# Patient Record
Sex: FEMALE | Race: WHITE | NOT HISPANIC OR LATINO | Employment: UNEMPLOYED | URBAN - METROPOLITAN AREA
[De-identification: names, ages, dates, MRNs, and addresses within clinical notes are randomized per-mention and may not be internally consistent; named-entity substitution may affect disease eponyms.]

---

## 2017-03-08 ENCOUNTER — TELEPHONE (OUTPATIENT)
Dept: OBSTETRICS AND GYNECOLOGY | Facility: CLINIC | Age: 52
End: 2017-03-08

## 2017-03-08 RX ORDER — FLUCONAZOLE 150 MG/1
150 TABLET ORAL ONCE
Qty: 1 TABLET | Refills: 0 | Status: SHIPPED | OUTPATIENT
Start: 2017-03-08 | End: 2017-03-08

## 2017-03-08 NOTE — TELEPHONE ENCOUNTER
----- Message from Celeste Byers sent at 3/8/2017  2:33 PM EST -----  Contact: Patient  Patient c/o yeast infection - itching.  Requesting Diflucan.  Going out of town Friday - requesting rx to be called in by then.

## 2017-04-17 ENCOUNTER — OFFICE VISIT (OUTPATIENT)
Dept: OBSTETRICS AND GYNECOLOGY | Facility: CLINIC | Age: 52
End: 2017-04-17

## 2017-04-17 VITALS
HEART RATE: 66 BPM | BODY MASS INDEX: 25.71 KG/M2 | SYSTOLIC BLOOD PRESSURE: 126 MMHG | HEIGHT: 64 IN | WEIGHT: 150.6 LBS | DIASTOLIC BLOOD PRESSURE: 67 MMHG

## 2017-04-17 DIAGNOSIS — N89.8 VAGINAL DISCHARGE: ICD-10-CM

## 2017-04-17 DIAGNOSIS — R53.83 FATIGUE, UNSPECIFIED TYPE: ICD-10-CM

## 2017-04-17 DIAGNOSIS — Z01.419 WELL WOMAN EXAM WITH ROUTINE GYNECOLOGICAL EXAM: ICD-10-CM

## 2017-04-17 DIAGNOSIS — Z12.4 ROUTINE CERVICAL SMEAR: Primary | ICD-10-CM

## 2017-04-17 LAB
BASOPHILS # BLD AUTO: 0.01 10*3/MM3 (ref 0–0.2)
BASOPHILS NFR BLD AUTO: 0.2 % (ref 0–1.5)
EOSINOPHIL # BLD AUTO: 0.17 10*3/MM3 (ref 0–0.7)
EOSINOPHIL NFR BLD AUTO: 3.2 % (ref 0.3–6.2)
ERYTHROCYTE [DISTWIDTH] IN BLOOD BY AUTOMATED COUNT: 12.7 % (ref 11.7–13)
HCT VFR BLD AUTO: 40.4 % (ref 35.6–45.5)
HGB BLD-MCNC: 13.9 G/DL (ref 11.9–15.5)
IMM GRANULOCYTES # BLD: 0 10*3/MM3 (ref 0–0.03)
IMM GRANULOCYTES NFR BLD: 0 % (ref 0–0.5)
LYMPHOCYTES # BLD AUTO: 1.55 10*3/MM3 (ref 0.9–4.8)
LYMPHOCYTES NFR BLD AUTO: 29.2 % (ref 19.6–45.3)
MCH RBC QN AUTO: 31.4 PG (ref 26.9–32)
MCHC RBC AUTO-ENTMCNC: 34.4 G/DL (ref 32.4–36.3)
MCV RBC AUTO: 91.2 FL (ref 80.5–98.2)
MONOCYTES # BLD AUTO: 0.45 10*3/MM3 (ref 0.2–1.2)
MONOCYTES NFR BLD AUTO: 8.5 % (ref 5–12)
NEUTROPHILS # BLD AUTO: 3.13 10*3/MM3 (ref 1.9–8.1)
NEUTROPHILS NFR BLD AUTO: 58.9 % (ref 42.7–76)
PLATELET # BLD AUTO: 170 10*3/MM3 (ref 140–500)
RBC # BLD AUTO: 4.43 10*6/MM3 (ref 3.9–5.2)
TSH SERPL DL<=0.005 MIU/L-ACNC: 0.94 MIU/ML (ref 0.27–4.2)
WBC # BLD AUTO: 5.31 10*3/MM3 (ref 4.5–10.7)

## 2017-04-17 PROCEDURE — 99396 PREV VISIT EST AGE 40-64: CPT | Performed by: OBSTETRICS & GYNECOLOGY

## 2017-04-17 RX ORDER — CELECOXIB 200 MG/1
CAPSULE ORAL
COMMUNITY
Start: 2017-03-30 | End: 2017-11-07

## 2017-04-17 NOTE — PROGRESS NOTES
Subha Wright is a 51 y.o. female     CC: Annual and pap    Last annual 2015  Last pap   Last mammogram August 3, 2015  Last colonoscopy Approximately 2 years ago, some polyps removed.       History of Present Illness: due for pap; mammogram scheduled for Wednesday.  Is having considerable fatigue (even though periods are shorter and getting a little further apart).  Having d/c like another yeast infection; but has begun probiotics.     The following portions of the patient's history were reviewed and updated as appropriate: allergies, current medications, past family history, past medical history, past social history, past surgical history and problem list.    Review of Systems   Constitutional: Negative for fatigue and fever.   HENT: Negative for congestion.    Eyes: Negative for visual disturbance.   Respiratory: Negative for chest tightness and shortness of breath.    Cardiovascular: Negative for chest pain, palpitations and leg swelling.   Gastrointestinal: Negative for abdominal pain and blood in stool.   Endocrine: Negative for cold intolerance.   Genitourinary: Negative for difficulty urinating, dysuria, frequency, genital sores, hematuria, menstrual problem, pelvic pain, urgency, vaginal bleeding, vaginal discharge and vaginal pain.   Musculoskeletal: Negative for back pain.   Skin: Negative for color change and rash.   Neurological: Negative for syncope and headaches.   Psychiatric/Behavioral: Negative for sleep disturbance.       History reviewed. No pertinent past medical history.  Past Surgical History:   Procedure Laterality Date   • HEMORRHOIDECTOMY       OB History      Para Term  AB TAB SAB Ectopic Multiple Living    3 3        3        Menstrual History:  OB History      Para Term  AB TAB SAB Ectopic Multiple Living    3 3        3         Menarche age: 10  Patient's last menstrual period was 2017 (approximate).       Family  History   Problem Relation Age of Onset   • Migraines Mother    • Stroke Mother    • Diabetes Father    • Hypertension Father    • Colon cancer Brother      History   Smoking Status   • Former Smoker   Smokeless Tobacco   • Former User   • Quit date: 1990     History   Alcohol Use   • Yes     Comment: OCCASIONALLY       Objective   Physical Exam   Constitutional: She is oriented to person, place, and time. She appears well-developed and well-nourished.   HENT:   Head: Normocephalic and atraumatic.   Right Ear: External ear normal.   Left Ear: External ear normal.   Nose: Nose normal.   Eyes: EOM are normal. Pupils are equal, round, and reactive to light.   Neck: Normal range of motion. Neck supple. No thyromegaly present.   Cardiovascular: Normal rate, regular rhythm and normal heart sounds.    No murmur heard.  Pulmonary/Chest: Effort normal and breath sounds normal. She has no wheezes. She has no rales. She exhibits no tenderness. Right breast exhibits no inverted nipple, no mass, no nipple discharge, no skin change and no tenderness. Left breast exhibits no inverted nipple, no mass, no nipple discharge, no skin change and no tenderness. There is no breast swelling.   Abdominal: Soft. Bowel sounds are normal. She exhibits no distension and no mass. There is no tenderness. Hernia confirmed negative in the right inguinal area and confirmed negative in the left inguinal area.   Genitourinary: Uterus normal. No breast tenderness. Pelvic exam was performed with patient supine. There is no rash, tenderness or lesion on the right labia. There is no rash, tenderness or lesion on the left labia. Cervix exhibits no motion tenderness, no discharge and no friability. Right adnexum displays no mass and no tenderness. Left adnexum displays no mass and no tenderness. No erythema, tenderness or bleeding in the vagina. No vaginal discharge found.   Musculoskeletal: Normal range of motion. She exhibits no edema.   Neurological: She  is alert and oriented to person, place, and time.   Skin: Skin is warm and dry. No rash noted.   Psychiatric: She has a normal mood and affect. Judgment normal.   Nursing note and vitals reviewed.        Assessment/Plan   Catie was seen today for annual exam.    Diagnoses and all orders for this visit:    Routine cervical smear  -     IGP, Aptima HPV, Rfx 16 / 18,45    Well woman exam with routine gynecological exam    Fatigue, unspecified type: she will call and schedule with her PCP: Dr. Yolanda Dupree.  -     CBC & Differential  -     TSH    Vaginal discharge  -     NuSwab BV & Candida      Will call with lab results.  Will continue with annual WWE's, annual mammography, SBE's, and daily calcium + D

## 2017-04-18 ENCOUNTER — TELEPHONE (OUTPATIENT)
Dept: OBSTETRICS AND GYNECOLOGY | Facility: CLINIC | Age: 52
End: 2017-04-18

## 2017-04-19 ENCOUNTER — APPOINTMENT (OUTPATIENT)
Dept: WOMENS IMAGING | Facility: HOSPITAL | Age: 52
End: 2017-04-19

## 2017-04-19 PROCEDURE — 77067 SCR MAMMO BI INCL CAD: CPT | Performed by: RADIOLOGY

## 2017-04-19 PROCEDURE — 77063 BREAST TOMOSYNTHESIS BI: CPT | Performed by: RADIOLOGY

## 2017-04-19 PROCEDURE — G0202 SCR MAMMO BI INCL CAD: HCPCS | Performed by: RADIOLOGY

## 2017-04-20 ENCOUNTER — TELEPHONE (OUTPATIENT)
Dept: OBSTETRICS AND GYNECOLOGY | Facility: CLINIC | Age: 52
End: 2017-04-20

## 2017-04-20 LAB
A VAGINAE DNA VAG QL NAA+PROBE: ABNORMAL SCORE
BVAB2 DNA VAG QL NAA+PROBE: ABNORMAL SCORE
C ALBICANS DNA VAG QL NAA+PROBE: POSITIVE
C GLABRATA DNA VAG QL NAA+PROBE: NEGATIVE
CYTOLOGIST CVX/VAG CYTO: ABNORMAL
CYTOLOGY CVX/VAG DOC THIN PREP: ABNORMAL
DX ICD CODE: ABNORMAL
DX ICD CODE: ABNORMAL
HIV 1 & 2 AB SER-IMP: ABNORMAL
HPV I/H RISK 4 DNA CVX QL PROBE+SIG AMP: NEGATIVE
MEGA1 DNA VAG QL NAA+PROBE: ABNORMAL SCORE
OTHER STN SPEC: ABNORMAL
PATH REPORT.FINAL DX SPEC: ABNORMAL
PATHOLOGIST CVX/VAG CYTO: ABNORMAL
STAT OF ADQ CVX/VAG CYTO-IMP: ABNORMAL

## 2017-04-20 NOTE — TELEPHONE ENCOUNTER
Pt is aware of results.     Still waiting on vaginal culture results to come back. Pt states she is having a lot of vaginal irritation, a little whitish discharge but no odor and was wondering if you could send something in for relief?

## 2017-04-21 ENCOUNTER — TELEPHONE (OUTPATIENT)
Dept: OBSTETRICS AND GYNECOLOGY | Facility: CLINIC | Age: 52
End: 2017-04-21

## 2017-04-21 RX ORDER — FLUCONAZOLE 150 MG/1
150 TABLET ORAL ONCE
Qty: 1 TABLET | Refills: 0 | Status: SHIPPED | OUTPATIENT
Start: 2017-04-21 | End: 2017-04-21

## 2017-11-07 ENCOUNTER — OFFICE VISIT (OUTPATIENT)
Dept: OBSTETRICS AND GYNECOLOGY | Facility: CLINIC | Age: 52
End: 2017-11-07

## 2017-11-07 VITALS
DIASTOLIC BLOOD PRESSURE: 77 MMHG | HEART RATE: 70 BPM | WEIGHT: 149 LBS | SYSTOLIC BLOOD PRESSURE: 116 MMHG | BODY MASS INDEX: 25.44 KG/M2 | HEIGHT: 64 IN

## 2017-11-07 DIAGNOSIS — R10.32 LEFT LOWER QUADRANT PAIN: Primary | ICD-10-CM

## 2017-11-07 PROCEDURE — 99213 OFFICE O/P EST LOW 20 MIN: CPT | Performed by: OBSTETRICS & GYNECOLOGY

## 2017-11-07 NOTE — PROGRESS NOTES
Subjective   Catie Wright is a 51 y.o. female.   Cc: Left lower quadrant pain  History of Present Illness  At onset of left lower quadrant pain over the weekend at which is gradually improved through today.  Last menstrual period was approximately 1 month ago.  Her periods become less frequent and lighter.  She denies any deep dyspareunia.  Patient previously had a Pap smear with ASCUS HPV negative.  Patient did have an episode of diarrhea starting on Wednesday (pain started worsening Thursday) . no change in pain status with bowel movements  The following portions of the patient's history were reviewed and updated as appropriate: allergies, current medications, past family history, past medical history, past social history, past surgical history and problem list.    Review of Systems   Gastrointestinal: Positive for diarrhea.        Left lower quadrant pain   All other systems reviewed and are negative.      Objective   Physical Exam   Constitutional: She is oriented to person, place, and time. She appears well-developed and well-nourished. No distress.   Abdominal: Soft. Bowel sounds are normal. She exhibits no mass. There is tenderness in the left lower quadrant. There is no rebound and no guarding. No hernia.       Genitourinary: Vagina normal and uterus normal. Rectal exam shows external hemorrhoid. Rectal exam shows no mass. Pelvic exam was performed with patient supine. There is no lesion on the right labia. There is no lesion on the left labia. Cervix exhibits discharge. Cervix exhibits no motion tenderness and no friability. Right adnexum displays tenderness. Right adnexum displays no mass and no fullness. Left adnexum displays tenderness and fullness.   Neurological: She is alert and oriented to person, place, and time.   Skin: Skin is warm and dry.   Psychiatric: She has a normal mood and affect. Her behavior is normal. Judgment and thought content normal.   Vitals reviewed.      Assessment/Plan   Catie  was seen today for pelvic pain.    Diagnoses and all orders for this visit:    Left lower quadrant pain  Comments:  We'll get ultrasound with same-day appointment

## 2017-11-15 ENCOUNTER — PROCEDURE VISIT (OUTPATIENT)
Dept: OBSTETRICS AND GYNECOLOGY | Facility: CLINIC | Age: 52
End: 2017-11-15

## 2017-11-15 ENCOUNTER — OFFICE VISIT (OUTPATIENT)
Dept: OBSTETRICS AND GYNECOLOGY | Facility: CLINIC | Age: 52
End: 2017-11-15

## 2017-11-15 VITALS
SYSTOLIC BLOOD PRESSURE: 110 MMHG | BODY MASS INDEX: 25.61 KG/M2 | WEIGHT: 150 LBS | DIASTOLIC BLOOD PRESSURE: 62 MMHG | HEIGHT: 64 IN

## 2017-11-15 DIAGNOSIS — R10.2 PELVIC PAIN: Primary | ICD-10-CM

## 2017-11-15 DIAGNOSIS — R10.32 LLQ PAIN: Primary | ICD-10-CM

## 2017-11-15 PROCEDURE — 76830 TRANSVAGINAL US NON-OB: CPT | Performed by: OBSTETRICS & GYNECOLOGY

## 2017-11-15 PROCEDURE — 99213 OFFICE O/P EST LOW 20 MIN: CPT | Performed by: OBSTETRICS & GYNECOLOGY

## 2017-11-15 NOTE — PROGRESS NOTES
Subjective   Catie Wright is a 51 y.o. female.   Cc: Follow-up ultrasound for pelvic pain  History of Present Illness  Penicillins resolve her the most part now seems to be mostly muscular around her hip and groin.  Ultrasound was totally within normal limits.  Patient is reassured  The following portions of the patient's history were reviewed and updated as appropriate: allergies, current medications, past family history, past medical history, past social history, past surgical history and problem list.    Review of Systems   Genitourinary: Positive for pelvic pain.   All other systems reviewed and are negative.      Objective   Physical Exam   Constitutional: She is oriented to person, place, and time. She appears well-developed and well-nourished.   Neurological: She is alert and oriented to person, place, and time.   Skin: Skin is warm and dry.   Psychiatric: She has a normal mood and affect. Her behavior is normal. Judgment and thought content normal.   Vitals reviewed.      Assessment/Plan   Catie was seen today for gynecologic exam.    Diagnoses and all orders for this visit:    Pelvic pain  Comments:  Probably musculoskeletal in origin       Face-to-face consultation 15 minutes of which 15 minutes was spent discussing the findings

## 2017-11-28 ENCOUNTER — TELEPHONE (OUTPATIENT)
Dept: OBSTETRICS AND GYNECOLOGY | Facility: CLINIC | Age: 52
End: 2017-11-28

## 2017-11-28 RX ORDER — FLUCONAZOLE 150 MG/1
150 TABLET ORAL DAILY
Qty: 1 TABLET | Refills: 0 | Status: SHIPPED | OUTPATIENT
Start: 2017-11-28 | End: 2020-07-07

## 2017-11-28 NOTE — TELEPHONE ENCOUNTER
----- Message from Trey Lewis MD sent at 11/28/2017 11:02 AM EST -----  The patient prescription is been sent to her pharmacy.  If there is no relief she needs to come in to be examined  ----- Message -----     From: Bina Rahman     Sent: 11/28/2017  10:15 AM       To: Trey Lewis MD    Patient called stating that she believes she has a yeast infection and was wondering if she needed to come in or if she could have something called into her pharmacy for it? She states that Dr. Temple has in the past called her in a series of three-pills for a yeast infection and if you were able, could you send that in for her? Please advise.    Call back #: [phone]

## 2018-10-30 ENCOUNTER — APPOINTMENT (OUTPATIENT)
Dept: WOMENS IMAGING | Facility: HOSPITAL | Age: 53
End: 2018-10-30

## 2018-10-30 PROCEDURE — 77067 SCR MAMMO BI INCL CAD: CPT | Performed by: RADIOLOGY

## 2018-10-30 PROCEDURE — 77063 BREAST TOMOSYNTHESIS BI: CPT | Performed by: RADIOLOGY

## 2019-11-04 ENCOUNTER — APPOINTMENT (OUTPATIENT)
Dept: WOMENS IMAGING | Facility: HOSPITAL | Age: 54
End: 2019-11-04

## 2019-11-04 PROCEDURE — 77063 BREAST TOMOSYNTHESIS BI: CPT | Performed by: RADIOLOGY

## 2019-11-04 PROCEDURE — 77067 SCR MAMMO BI INCL CAD: CPT | Performed by: RADIOLOGY

## 2020-06-29 DIAGNOSIS — Z00.00 ANNUAL PHYSICAL EXAM: Primary | ICD-10-CM

## 2020-06-29 LAB
ALBUMIN SERPL-MCNC: 4.8 G/DL (ref 3.5–5.2)
ALBUMIN/GLOB SERPL: 2.2 G/DL
ALP SERPL-CCNC: 67 U/L (ref 39–117)
ALT SERPL-CCNC: 31 U/L (ref 1–33)
AST SERPL-CCNC: 20 U/L (ref 1–32)
BASOPHILS # BLD AUTO: 0.03 10*3/MM3 (ref 0–0.2)
BASOPHILS NFR BLD AUTO: 0.7 % (ref 0–1.5)
BILIRUB SERPL-MCNC: 0.6 MG/DL (ref 0.2–1.2)
BUN SERPL-MCNC: 7 MG/DL (ref 6–20)
BUN/CREAT SERPL: 8.6 (ref 7–25)
CALCIUM SERPL-MCNC: 9.6 MG/DL (ref 8.6–10.5)
CHLORIDE SERPL-SCNC: 101 MMOL/L (ref 98–107)
CHOLEST SERPL-MCNC: 242 MG/DL (ref 0–200)
CO2 SERPL-SCNC: 30.2 MMOL/L (ref 22–29)
CREAT SERPL-MCNC: 0.81 MG/DL (ref 0.57–1)
EOSINOPHIL # BLD AUTO: 0.13 10*3/MM3 (ref 0–0.4)
EOSINOPHIL NFR BLD AUTO: 2.9 % (ref 0.3–6.2)
ERYTHROCYTE [DISTWIDTH] IN BLOOD BY AUTOMATED COUNT: 12.8 % (ref 12.3–15.4)
GLOBULIN SER CALC-MCNC: 2.2 GM/DL
GLUCOSE SERPL-MCNC: 85 MG/DL (ref 65–99)
HCT VFR BLD AUTO: 39.4 % (ref 34–46.6)
HDLC SERPL-MCNC: 50 MG/DL (ref 40–60)
HGB BLD-MCNC: 13.9 G/DL (ref 12–15.9)
IMM GRANULOCYTES # BLD AUTO: 0.02 10*3/MM3 (ref 0–0.05)
IMM GRANULOCYTES NFR BLD AUTO: 0.5 % (ref 0–0.5)
LDLC SERPL CALC-MCNC: 159 MG/DL (ref 0–100)
LYMPHOCYTES # BLD AUTO: 1.6 10*3/MM3 (ref 0.7–3.1)
LYMPHOCYTES NFR BLD AUTO: 36.1 % (ref 19.6–45.3)
MCH RBC QN AUTO: 31.6 PG (ref 26.6–33)
MCHC RBC AUTO-ENTMCNC: 35.3 G/DL (ref 31.5–35.7)
MCV RBC AUTO: 89.5 FL (ref 79–97)
MONOCYTES # BLD AUTO: 0.34 10*3/MM3 (ref 0.1–0.9)
MONOCYTES NFR BLD AUTO: 7.7 % (ref 5–12)
NEUTROPHILS # BLD AUTO: 2.31 10*3/MM3 (ref 1.7–7)
NEUTROPHILS NFR BLD AUTO: 52.1 % (ref 42.7–76)
NRBC BLD AUTO-RTO: 0 /100 WBC (ref 0–0.2)
PLATELET # BLD AUTO: 186 10*3/MM3 (ref 140–450)
POTASSIUM SERPL-SCNC: 4.4 MMOL/L (ref 3.5–5.2)
PROT SERPL-MCNC: 7 G/DL (ref 6–8.5)
RBC # BLD AUTO: 4.4 10*6/MM3 (ref 3.77–5.28)
SODIUM SERPL-SCNC: 140 MMOL/L (ref 136–145)
TRIGL SERPL-MCNC: 163 MG/DL (ref 0–150)
VLDLC SERPL CALC-MCNC: 32.6 MG/DL
WBC # BLD AUTO: 4.43 10*3/MM3 (ref 3.4–10.8)

## 2020-07-07 ENCOUNTER — OFFICE VISIT (OUTPATIENT)
Dept: INTERNAL MEDICINE | Facility: CLINIC | Age: 55
End: 2020-07-07

## 2020-07-07 VITALS
HEIGHT: 64 IN | DIASTOLIC BLOOD PRESSURE: 72 MMHG | HEART RATE: 76 BPM | TEMPERATURE: 97.5 F | BODY MASS INDEX: 25.27 KG/M2 | WEIGHT: 148 LBS | SYSTOLIC BLOOD PRESSURE: 118 MMHG

## 2020-07-07 DIAGNOSIS — Z11.59 ENCOUNTER FOR HEPATITIS C SCREENING TEST FOR LOW RISK PATIENT: Primary | ICD-10-CM

## 2020-07-07 DIAGNOSIS — Z00.00 ANNUAL PHYSICAL EXAM: ICD-10-CM

## 2020-07-07 PROCEDURE — 99396 PREV VISIT EST AGE 40-64: CPT | Performed by: INTERNAL MEDICINE

## 2020-07-07 RX ORDER — MULTIVIT-MIN/IRON FUM/FOLIC AC 7.5 MG-4
1 TABLET ORAL DAILY
Qty: 30 TABLET | Refills: 11 | COMMUNITY
Start: 2020-07-07 | End: 2021-07-07

## 2020-07-07 RX ORDER — ZINC OXIDE 13 %
1 CREAM (GRAM) TOPICAL DAILY
Qty: 90 CAPSULE | Refills: 0 | COMMUNITY
Start: 2020-07-07 | End: 2021-12-14

## 2020-07-07 NOTE — PROGRESS NOTES
"Subha Wright is a 54 y.o. female and is here for a comprehensive physical exam. The patient reports no problems.  Daughter moved back in, is getting her eating better and exercising more.  She has added some vitamins/supplements.    Pt is UTD with annual gyn exam and mammo - [facility]          Social History     Socioeconomic History   • Marital status:      Spouse name: Not on file   • Number of children: Not on file   • Years of education: Not on file   • Highest education level: Not on file   Tobacco Use   • Smoking status: Never Smoker   • Smokeless tobacco: Former User   Substance and Sexual Activity   • Alcohol use: Yes     Comment: OCCASIONALLY   • Drug use: No   • Sexual activity: Yes     Partners: Male     Birth control/protection: Surgical     Comment: vassectomy       Family History   Problem Relation Age of Onset   • Migraines Mother    • Stroke Mother    • Diabetes Father    • Hypertension Father    • Colon cancer Brother 59        lung, colon   • Mental illness Sister         History reviewed. No pertinent past medical history.       Current Outpatient Medications:   •  Multiple Vitamins-Minerals (MULTIVITAMIN WITH MINERALS) tablet, Take 1 tablet by mouth Daily., Disp: 30 tablet, Rfl: 11  •  Probiotic Product (PROBIOTIC DAILY) capsule, Take 1 tablet by mouth Daily., Disp: 90 capsule, Rfl: 0    Review of Systems    Review of Systems   Constitutional: Negative.    HENT: Negative.    Respiratory: Negative.    Cardiovascular: Negative.    Gastrointestinal: Negative.    Genitourinary: Negative.    Musculoskeletal: Negative.    Skin: Negative.    Neurological: Negative.    Psychiatric/Behavioral: Negative.         There were no vitals filed for this visit.    Vitals:    07/07/20 0837   BP: 118/72   Pulse: 76   Temp: 97.5 °F (36.4 °C)   Weight: 67.1 kg (148 lb)   Height: 162.5 cm (63.98\")     Body mass index is 25.42 kg/m².  Wt Readings from Last 3 Encounters:   07/07/20 67.1 kg (148 " lb)   06/24/20 71.7 kg (158 lb)   02/02/20 68 kg (150 lb)      Objective     Physical Exam   Constitutional: She is oriented to person, place, and time. No distress.   Eyes: Conjunctivae are normal.   Neck: No thyromegaly present.   Cardiovascular: Normal rate, regular rhythm, normal heart sounds and intact distal pulses.   Pulmonary/Chest: Effort normal and breath sounds normal.   Abdominal: Soft. Bowel sounds are normal.   Musculoskeletal: She exhibits no edema or tenderness.   Lymphadenopathy:     She has no cervical adenopathy.   Neurological: She is alert and oriented to person, place, and time.   Skin: Skin is warm and dry.   Psychiatric: She has a normal mood and affect. Her behavior is normal. Judgment and thought content normal.       Procedures       Assessment/Plan         Diagnoses and all orders for this visit:    Encounter for hepatitis C screening test for low risk patient  -     Hepatitis C Antibody; Future    Annual physical exam  Comments:  Doing great- agree with diet/supplement plan- forward me the results.  Continue exercise, etc. and recheck annually or as needed.   Orders:  -     Comprehensive Metabolic Panel; Future  -     Lipid Panel With / Chol / HDL Ratio; Future  -     TSH; Future    Other orders  -     Multiple Vitamins-Minerals (MULTIVITAMIN WITH MINERALS) tablet; Take 1 tablet by mouth Daily.  -     Probiotic Product (PROBIOTIC DAILY) capsule; Take 1 tablet by mouth Daily.        Return in about 1 year (around 7/7/2021) for Annual physical, Lab Before [facility]UP.

## 2020-11-05 ENCOUNTER — APPOINTMENT (OUTPATIENT)
Dept: WOMENS IMAGING | Facility: HOSPITAL | Age: 55
End: 2020-11-05

## 2020-11-05 PROCEDURE — 77067 SCR MAMMO BI INCL CAD: CPT | Performed by: RADIOLOGY

## 2020-11-05 PROCEDURE — 77063 BREAST TOMOSYNTHESIS BI: CPT | Performed by: RADIOLOGY

## 2021-03-30 ENCOUNTER — BULK ORDERING (OUTPATIENT)
Dept: CASE MANAGEMENT | Facility: OTHER | Age: 56
End: 2021-03-30

## 2021-03-30 DIAGNOSIS — Z23 IMMUNIZATION DUE: ICD-10-CM

## 2021-04-08 ENCOUNTER — IMMUNIZATION (OUTPATIENT)
Dept: VACCINE CLINIC | Facility: HOSPITAL | Age: 56
End: 2021-04-08

## 2021-04-08 DIAGNOSIS — Z23 IMMUNIZATION DUE: ICD-10-CM

## 2021-04-08 PROCEDURE — 0001A: CPT | Performed by: INTERNAL MEDICINE

## 2021-04-08 PROCEDURE — 91300 HC SARSCOV02 VAC 30MCG/0.3ML IM: CPT | Performed by: INTERNAL MEDICINE

## 2021-04-29 ENCOUNTER — IMMUNIZATION (OUTPATIENT)
Dept: VACCINE CLINIC | Facility: HOSPITAL | Age: 56
End: 2021-04-29

## 2021-04-29 PROCEDURE — 0002A: CPT | Performed by: INTERNAL MEDICINE

## 2021-04-29 PROCEDURE — 91300 HC SARSCOV02 VAC 30MCG/0.3ML IM: CPT | Performed by: INTERNAL MEDICINE

## 2021-11-10 ENCOUNTER — APPOINTMENT (OUTPATIENT)
Dept: WOMENS IMAGING | Facility: HOSPITAL | Age: 56
End: 2021-11-10

## 2021-11-10 PROCEDURE — 77063 BREAST TOMOSYNTHESIS BI: CPT | Performed by: RADIOLOGY

## 2021-11-10 PROCEDURE — 77067 SCR MAMMO BI INCL CAD: CPT | Performed by: RADIOLOGY

## 2021-11-30 ENCOUNTER — IMMUNIZATION (OUTPATIENT)
Dept: VACCINE CLINIC | Facility: HOSPITAL | Age: 56
End: 2021-11-30

## 2021-11-30 PROCEDURE — 0004A HC ADM SARSCOV2 30MCG/0.3ML BOOSTER: CPT | Performed by: INTERNAL MEDICINE

## 2021-11-30 PROCEDURE — 91300 HC SARSCOV02 VAC 30MCG/0.3ML IM: CPT | Performed by: INTERNAL MEDICINE

## 2021-12-02 PROCEDURE — 96375 TX/PRO/DX INJ NEW DRUG ADDON: CPT

## 2021-12-02 PROCEDURE — 96374 THER/PROPH/DIAG INJ IV PUSH: CPT

## 2021-12-02 PROCEDURE — 99283 EMERGENCY DEPT VISIT LOW MDM: CPT

## 2021-12-02 PROCEDURE — 36415 COLL VENOUS BLD VENIPUNCTURE: CPT

## 2021-12-02 RX ORDER — SODIUM CHLORIDE 0.9 % (FLUSH) 0.9 %
10 SYRINGE (ML) INJECTION AS NEEDED
Status: DISCONTINUED | OUTPATIENT
Start: 2021-12-02 | End: 2021-12-03 | Stop reason: HOSPADM

## 2021-12-03 ENCOUNTER — APPOINTMENT (OUTPATIENT)
Dept: CT IMAGING | Facility: HOSPITAL | Age: 56
End: 2021-12-03

## 2021-12-03 ENCOUNTER — TELEPHONE (OUTPATIENT)
Dept: INTERNAL MEDICINE | Facility: CLINIC | Age: 56
End: 2021-12-03

## 2021-12-03 ENCOUNTER — HOSPITAL ENCOUNTER (EMERGENCY)
Facility: HOSPITAL | Age: 56
Discharge: HOME | End: 2021-12-03
Attending: EMERGENCY MEDICINE | Admitting: EMERGENCY MEDICINE

## 2021-12-03 VITALS
SYSTOLIC BLOOD PRESSURE: 123 MMHG | OXYGEN SATURATION: 96 % | BODY MASS INDEX: 26.12 KG/M2 | HEIGHT: 64 IN | HEART RATE: 70 BPM | DIASTOLIC BLOOD PRESSURE: 78 MMHG | TEMPERATURE: 97.5 F | WEIGHT: 153 LBS | RESPIRATION RATE: 17 BRPM

## 2021-12-03 DIAGNOSIS — R10.2 PELVIC PAIN: Primary | ICD-10-CM

## 2021-12-03 LAB
ALBUMIN SERPL-MCNC: 4.6 G/DL (ref 3.5–5.2)
ALBUMIN/GLOB SERPL: 2 G/DL
ALP SERPL-CCNC: 62 U/L (ref 39–117)
ALT SERPL W P-5'-P-CCNC: 30 U/L (ref 1–33)
ANION GAP SERPL CALCULATED.3IONS-SCNC: 8.4 MMOL/L (ref 5–15)
AST SERPL-CCNC: 24 U/L (ref 1–32)
BASOPHILS # BLD AUTO: 0.01 10*3/MM3 (ref 0–0.2)
BASOPHILS NFR BLD AUTO: 0.3 % (ref 0–1.5)
BILIRUB SERPL-MCNC: 0.3 MG/DL (ref 0–1.2)
BILIRUB UR QL STRIP: NEGATIVE
BUN SERPL-MCNC: 8 MG/DL (ref 6–20)
BUN/CREAT SERPL: 12.7 (ref 7–25)
CALCIUM SPEC-SCNC: 9.2 MG/DL (ref 8.6–10.5)
CHLORIDE SERPL-SCNC: 104 MMOL/L (ref 98–107)
CLARITY UR: CLEAR
CO2 SERPL-SCNC: 27.6 MMOL/L (ref 22–29)
COLOR UR: YELLOW
CREAT SERPL-MCNC: 0.63 MG/DL (ref 0.57–1)
DEPRECATED RDW RBC AUTO: 42.6 FL (ref 37–54)
EOSINOPHIL # BLD AUTO: 0.14 10*3/MM3 (ref 0–0.4)
EOSINOPHIL NFR BLD AUTO: 3.8 % (ref 0.3–6.2)
ERYTHROCYTE [DISTWIDTH] IN BLOOD BY AUTOMATED COUNT: 12.9 % (ref 12.3–15.4)
GFR SERPL CREATININE-BSD FRML MDRD: 97 ML/MIN/1.73
GLOBULIN UR ELPH-MCNC: 2.3 GM/DL
GLUCOSE SERPL-MCNC: 104 MG/DL (ref 65–99)
GLUCOSE UR STRIP-MCNC: NEGATIVE MG/DL
HCT VFR BLD AUTO: 41.4 % (ref 34–46.6)
HGB BLD-MCNC: 14.3 G/DL (ref 12–15.9)
HGB UR QL STRIP.AUTO: NEGATIVE
HOLD SPECIMEN: NORMAL
HOLD SPECIMEN: NORMAL
IMM GRANULOCYTES # BLD AUTO: 0.01 10*3/MM3 (ref 0–0.05)
IMM GRANULOCYTES NFR BLD AUTO: 0.3 % (ref 0–0.5)
KETONES UR QL STRIP: ABNORMAL
LEUKOCYTE ESTERASE UR QL STRIP.AUTO: NEGATIVE
LIPASE SERPL-CCNC: 40 U/L (ref 13–60)
LYMPHOCYTES # BLD AUTO: 1.32 10*3/MM3 (ref 0.7–3.1)
LYMPHOCYTES NFR BLD AUTO: 36 % (ref 19.6–45.3)
MCH RBC QN AUTO: 31.5 PG (ref 26.6–33)
MCHC RBC AUTO-ENTMCNC: 34.5 G/DL (ref 31.5–35.7)
MCV RBC AUTO: 91.2 FL (ref 79–97)
MONOCYTES # BLD AUTO: 0.45 10*3/MM3 (ref 0.1–0.9)
MONOCYTES NFR BLD AUTO: 12.3 % (ref 5–12)
NEUTROPHILS NFR BLD AUTO: 1.74 10*3/MM3 (ref 1.7–7)
NEUTROPHILS NFR BLD AUTO: 47.3 % (ref 42.7–76)
NITRITE UR QL STRIP: NEGATIVE
NRBC BLD AUTO-RTO: 0 /100 WBC (ref 0–0.2)
PH UR STRIP.AUTO: <=5 [PH] (ref 5–8)
PLATELET # BLD AUTO: 181 10*3/MM3 (ref 140–450)
PMV BLD AUTO: 10.9 FL (ref 6–12)
POTASSIUM SERPL-SCNC: 3.9 MMOL/L (ref 3.5–5.2)
PROT SERPL-MCNC: 6.9 G/DL (ref 6–8.5)
PROT UR QL STRIP: ABNORMAL
RBC # BLD AUTO: 4.54 10*6/MM3 (ref 3.77–5.28)
SODIUM SERPL-SCNC: 140 MMOL/L (ref 136–145)
SP GR UR STRIP: 1.03 (ref 1–1.03)
UROBILINOGEN UR QL STRIP: ABNORMAL
WBC NRBC COR # BLD: 3.67 10*3/MM3 (ref 3.4–10.8)
WHOLE BLOOD HOLD SPECIMEN: NORMAL
WHOLE BLOOD HOLD SPECIMEN: NORMAL

## 2021-12-03 PROCEDURE — 25010000002 ONDANSETRON PER 1 MG: Performed by: PHYSICIAN ASSISTANT

## 2021-12-03 PROCEDURE — 25010000002 KETOROLAC TROMETHAMINE PER 15 MG: Performed by: PHYSICIAN ASSISTANT

## 2021-12-03 PROCEDURE — 83690 ASSAY OF LIPASE: CPT

## 2021-12-03 PROCEDURE — 81003 URINALYSIS AUTO W/O SCOPE: CPT

## 2021-12-03 PROCEDURE — 25010000002 IOPAMIDOL 61 % SOLUTION: Performed by: EMERGENCY MEDICINE

## 2021-12-03 PROCEDURE — 80053 COMPREHEN METABOLIC PANEL: CPT

## 2021-12-03 PROCEDURE — 74177 CT ABD & PELVIS W/CONTRAST: CPT

## 2021-12-03 PROCEDURE — 85025 COMPLETE CBC W/AUTO DIFF WBC: CPT

## 2021-12-03 RX ORDER — DICLOFENAC SODIUM 75 MG/1
75 TABLET, DELAYED RELEASE ORAL 2 TIMES DAILY
Qty: 20 TABLET | Refills: 0 | Status: SHIPPED | OUTPATIENT
Start: 2021-12-03 | End: 2021-12-14

## 2021-12-03 RX ORDER — SODIUM CHLORIDE 0.9 % (FLUSH) 0.9 %
10 SYRINGE (ML) INJECTION AS NEEDED
Status: DISCONTINUED | OUTPATIENT
Start: 2021-12-03 | End: 2021-12-03 | Stop reason: HOSPADM

## 2021-12-03 RX ORDER — ONDANSETRON 2 MG/ML
4 INJECTION INTRAMUSCULAR; INTRAVENOUS ONCE
Status: COMPLETED | OUTPATIENT
Start: 2021-12-03 | End: 2021-12-03

## 2021-12-03 RX ORDER — KETOROLAC TROMETHAMINE 15 MG/ML
15 INJECTION, SOLUTION INTRAMUSCULAR; INTRAVENOUS ONCE
Status: COMPLETED | OUTPATIENT
Start: 2021-12-03 | End: 2021-12-03

## 2021-12-03 RX ORDER — ONDANSETRON 4 MG/1
4 TABLET, ORALLY DISINTEGRATING ORAL EVERY 8 HOURS PRN
Qty: 12 TABLET | Refills: 0 | Status: SHIPPED | OUTPATIENT
Start: 2021-12-03 | End: 2021-12-14

## 2021-12-03 RX ADMIN — IOPAMIDOL 85 ML: 612 INJECTION, SOLUTION INTRAVENOUS at 03:39

## 2021-12-03 RX ADMIN — ONDANSETRON 4 MG: 2 INJECTION INTRAMUSCULAR; INTRAVENOUS at 03:06

## 2021-12-03 RX ADMIN — SODIUM CHLORIDE 1000 ML: 9 INJECTION, SOLUTION INTRAVENOUS at 03:08

## 2021-12-03 RX ADMIN — KETOROLAC TROMETHAMINE 15 MG: 15 INJECTION, SOLUTION INTRAMUSCULAR; INTRAVENOUS at 03:07

## 2021-12-03 NOTE — ED PROVIDER NOTES
The EUNICE and I have discussed this patients history, physical exam, and treatment plan. I have reviewed the documentation and personally had a face to face interaction with the patient. I affirm the documentation and agree with the treatment and plan.  The following note describes my personal findings    This patient is a 55-year-old female presenting to the emergency room today with lower abdominal/pelvic pain that is worse on the left.  She denies any fevers or chills, dysuria, or back pain.  She states that she had a unremarkable pelvic ultrasound and urinalysis in her gynecologist office recently.    Exam: This patient is resting comfortably and in no distress, without gross neurological deficit.  She is afebrile with stable vital signs and nontoxic in appearance.  Abdomen is soft with mild suprapubic and left lower quadrant abdomen/pelvic tenderness.  There is no rebound or guarding noted.    Plan: We will treat the patient's discomfort as we await labs, urinalysis, as well as a CT scan of the abdomen and pelvis.  We will monitor and reassess following.      The patient was wearing a facemask upon entrance into the room and remained in such throughout their visit.  I was wearing PPE including a facemask, eye protection, as well as gloves at any point entering the room and throughout the visit         Raymond Hawk MD  12/03/21 4428

## 2021-12-03 NOTE — ED PROVIDER NOTES
" EMERGENCY DEPARTMENT ENCOUNTER    Room Number:  01/01  Date of encounter:  12/3/2021  PCP: Yolanda Dupree MD  Historian: Patient      HPI:  Chief Complaint: pelvic pain       Context: Catie Wright is a 55 y.o. female who presents to the ED c/o pelvic pain.  Patient states that she started having lower abdominal and pelvic pain since last weekend.  Patient describes the pain as cramping and burning.  She saw her gynecologist on Tuesday and had an ultrasound and a urinalysis that were both \"okay\" per her doctor.  Patient states that her symptoms worsen this afternoon.  It is associated with nausea, dark urine, and constant pain.  Patient dates that the pain does seem to be radiating to her left side.  Denies any vomiting, diarrhea, constipation, fever, dysuria, or hematuria.  Denies any similar episodes.      PAST MEDICAL HISTORY  Active Ambulatory Problems     Diagnosis Date Noted   • No Active Ambulatory Problems     Resolved Ambulatory Problems     Diagnosis Date Noted   • No Resolved Ambulatory Problems     No Additional Past Medical History         PAST SURGICAL HISTORY  Past Surgical History:   Procedure Laterality Date   • HEMORRHOIDECTOMY           FAMILY HISTORY  Family History   Problem Relation Age of Onset   • Migraines Mother    • Stroke Mother    • Diabetes Father    • Hypertension Father    • Colon cancer Brother 59        lung, colon   • Mental illness Sister          SOCIAL HISTORY  Social History     Socioeconomic History   • Marital status:    Tobacco Use   • Smoking status: Never Smoker   • Smokeless tobacco: Former User   Vaping Use   • Vaping Use: Never used   Substance and Sexual Activity   • Alcohol use: Yes     Comment: OCCASIONALLY   • Drug use: No   • Sexual activity: Yes     Partners: Male     Birth control/protection: Surgical     Comment: vassectomy         ALLERGIES  Codeine        REVIEW OF SYSTEMS  Review of Systems     All systems reviewed and negative except for those " discussed in HPI.       PHYSICAL EXAM    I have reviewed the triage vital signs and nursing notes.    ED Triage Vitals   Temp Heart Rate Resp BP SpO2   12/02/21 1911 12/02/21 1911 12/02/21 1911 12/02/21 1911 12/02/21 1911   97.5 °F (36.4 °C) 70 16 149/98 96 %      Temp src Heart Rate Source Patient Position BP Location FiO2 (%)   -- 12/03/21 0019 12/03/21 0019 12/03/21 0019 --    Monitor Sitting Left arm        Physical Exam  GENERAL: Alert and oriented x3, not distressed  HENT: nares patent  EYES: no scleral icterus, PERRL, EOMI  CV: regular rhythm, regular rate  RESPIRATORY: normal effort  ABDOMEN: mild suprapubic tenderness, no rebound or guarding, normal bowel sounds  MUSCULOSKELETAL: no deformity  NEURO: alert, moves all extremities, follows commands  SKIN: warm, dry        LAB RESULTS  Recent Results (from the past 24 hour(s))   Comprehensive Metabolic Panel    Collection Time: 12/03/21  1:56 AM    Specimen: Blood   Result Value Ref Range    Glucose 104 (H) 65 - 99 mg/dL    BUN 8 6 - 20 mg/dL    Creatinine 0.63 0.57 - 1.00 mg/dL    Sodium 140 136 - 145 mmol/L    Potassium 3.9 3.5 - 5.2 mmol/L    Chloride 104 98 - 107 mmol/L    CO2 27.6 22.0 - 29.0 mmol/L    Calcium 9.2 8.6 - 10.5 mg/dL    Total Protein 6.9 6.0 - 8.5 g/dL    Albumin 4.60 3.50 - 5.20 g/dL    ALT (SGPT) 30 1 - 33 U/L    AST (SGOT) 24 1 - 32 U/L    Alkaline Phosphatase 62 39 - 117 U/L    Total Bilirubin 0.3 0.0 - 1.2 mg/dL    eGFR Non African Amer 97 >60 mL/min/1.73    Globulin 2.3 gm/dL    A/G Ratio 2.0 g/dL    BUN/Creatinine Ratio 12.7 7.0 - 25.0    Anion Gap 8.4 5.0 - 15.0 mmol/L   Lipase    Collection Time: 12/03/21  1:56 AM    Specimen: Blood   Result Value Ref Range    Lipase 40 13 - 60 U/L   Green Top (Gel)    Collection Time: 12/03/21  1:56 AM   Result Value Ref Range    Extra Tube Hold for add-ons.    Lavender Top    Collection Time: 12/03/21  1:56 AM   Result Value Ref Range    Extra Tube hold for add-on    Gold Top - SST    Collection  Time: 12/03/21  1:56 AM   Result Value Ref Range    Extra Tube Hold for add-ons.    Light Blue Top    Collection Time: 12/03/21  1:56 AM   Result Value Ref Range    Extra Tube hold for add-on    CBC Auto Differential    Collection Time: 12/03/21  1:56 AM    Specimen: Blood   Result Value Ref Range    WBC 3.67 3.40 - 10.80 10*3/mm3    RBC 4.54 3.77 - 5.28 10*6/mm3    Hemoglobin 14.3 12.0 - 15.9 g/dL    Hematocrit 41.4 34.0 - 46.6 %    MCV 91.2 79.0 - 97.0 fL    MCH 31.5 26.6 - 33.0 pg    MCHC 34.5 31.5 - 35.7 g/dL    RDW 12.9 12.3 - 15.4 %    RDW-SD 42.6 37.0 - 54.0 fl    MPV 10.9 6.0 - 12.0 fL    Platelets 181 140 - 450 10*3/mm3    Neutrophil % 47.3 42.7 - 76.0 %    Lymphocyte % 36.0 19.6 - 45.3 %    Monocyte % 12.3 (H) 5.0 - 12.0 %    Eosinophil % 3.8 0.3 - 6.2 %    Basophil % 0.3 0.0 - 1.5 %    Immature Grans % 0.3 0.0 - 0.5 %    Neutrophils, Absolute 1.74 1.70 - 7.00 10*3/mm3    Lymphocytes, Absolute 1.32 0.70 - 3.10 10*3/mm3    Monocytes, Absolute 0.45 0.10 - 0.90 10*3/mm3    Eosinophils, Absolute 0.14 0.00 - 0.40 10*3/mm3    Basophils, Absolute 0.01 0.00 - 0.20 10*3/mm3    Immature Grans, Absolute 0.01 0.00 - 0.05 10*3/mm3    nRBC 0.0 0.0 - 0.2 /100 WBC   Urinalysis With Microscopic If Indicated (No Culture) - Urine, Clean Catch    Collection Time: 12/03/21  1:57 AM    Specimen: Urine, Clean Catch   Result Value Ref Range    Color, UA Yellow Yellow, Straw    Appearance, UA Clear Clear    pH, UA <=5.0 5.0 - 8.0    Specific Gravity, UA 1.028 1.005 - 1.030    Glucose, UA Negative Negative    Ketones, UA Trace (A) Negative    Bilirubin, UA Negative Negative    Blood, UA Negative Negative    Protein, UA Trace (A) Negative    Leuk Esterase, UA Negative Negative    Nitrite, UA Negative Negative    Urobilinogen, UA 0.2 E.U./dL 0.2 - 1.0 E.U./dL       Ordered the above labs and independently reviewed the results.        RADIOLOGY  CT Abdomen Pelvis With Contrast    Result Date: 12/3/2021  CT OF THE ABDOMEN AND PELVIS  WITH CONTRAST  HISTORY: Pelvic pain on the left  COMPARISON: None available.  TECHNIQUE: Axial CT imaging was obtained through the abdomen and pelvis. IV contrast was administered.  FINDINGS: No acute findings are seen at the lung bases. The stomach, duodenum, adrenal glands, spleen, pancreas, and gallbladder all appear normal. Kidneys enhance symmetrically. There is no hydronephrosis. Urinary bladder appears normal. The patient does have a prominent gonadal vein on the left, uncertain clinical significance. There is colonic diverticulosis. There is no diverticulitis. There is no bowel obstruction. There is a fat-containing umbilical hernia. The appendix is normal. No suspicious adnexal masses are seen. No acute osseous abnormalities are identified.      Prominent gonadal vein on the left, of uncertain clinical significance.  Radiation dose reduction techniques were utilized, including automated exposure control and exposure modulation based on body size.  This report was finalized on 12/3/2021 3:59 AM by Dr. Shannon Watson M.D.        I ordered the above noted radiological studies. Reviewed by me and discussed with radiologist.  See dictation for official radiology interpretation.      PROCEDURES    Procedures      MEDICATIONS GIVEN IN ER    Medications   sodium chloride 0.9 % flush 10 mL (has no administration in time range)   sodium chloride 0.9 % flush 10 mL (has no administration in time range)   sodium chloride 0.9 % bolus 1,000 mL (0 mL Intravenous Stopped 12/3/21 0338)   ketorolac (TORADOL) injection 15 mg (15 mg Intravenous Given 12/3/21 0307)   ondansetron (ZOFRAN) injection 4 mg (4 mg Intravenous Given 12/3/21 0306)   iopamidol (ISOVUE-300) 61 % injection 100 mL (85 mL Intravenous Given 12/3/21 0339)         PROGRESS, DATA ANALYSIS, CONSULTS, AND MEDICAL DECISION MAKING    All labs have been independently reviewed by me.  All radiology studies have been reviewed by me and discussed with radiologist  dictating the report.   EKG's independently viewed and interpreted by me.  Discussion below represents my analysis of pertinent findings related to patient's condition, differential diagnosis, treatment plan and final disposition.    DDx: Includes but not limited to UTI, diverticulitis, kidney stone, kidney infection, ovarian cyst    ED Course as of 12/03/21 0648   Fri Dec 03, 2021   0344 WBC: 3.67 [EDWIGE]   0344 Hemoglobin: 14.3 [EDWIGE]   0344 Platelets: 181 [EDWIGE]   0344 Glucose(!): 104 [EDWIGE]   0344 BUN: 8 [EDWIGE]   0344 Creatinine: 0.63 [EDWIGE]   0344 Sodium: 140 [EDWIGE]   0344 Potassium: 3.9 [EDWIGE]   0344 Leukocytes, UA: Negative [EDWIGE]   0344 Nitrite, UA: Negative [EDWIGE]   0344 Lipase: 40 [EDWIGE]   0511 Patient rechecked, resting comfortably in bed, no acute distress.  Discussed results, diagnosis, and treatment plan.  Patient expressed understanding and agrees with plan. [EDWIGE]      ED Course User Index  [EDWIGE] Matt Gibbons PA       MDM: Patient does not have a urinary tract affection, there is no kidney stone seen on CT scan when she does not have appendicitis.  Blood work does not show any acute abnormality and her vital signs are stable.  Patient is safe for discharge home with follow-up with her GYN.    PPE: The patient wore a surgical mask throughout the entire patient encounter. I wore an N95.    AS OF 06:48 EST VITALS:    BP - 123/78  HR - 70  TEMP - 97.5 °F (36.4 °C)  O2 SATS - 96%        DIAGNOSIS  Final diagnoses:   Pelvic pain         DISPOSITION  DISCHARGE    Patient discharged in stable condition.    Reviewed implications of results, diagnosis, meds, responsibility to follow up, warning signs and symptoms of possible worsening, potential complications and reasons to return to ER.    Patient/Family voiced understanding of above instructions.    Discussed plan for discharge, as there is no emergent indication for admission. Patient referred to primary care provider for BP management due to today's BP. Pt/family is agreeable  and understands need for follow up and repeat testing.  Pt is aware that discharge does not mean that nothing is wrong but it indicates no emergency is present that requires admission and they must continue care with follow-up as given below or physician of their choice.     FOLLOW-UP  Yolanda Dupree MD  [address] [facility] [address]  [place] [address]  [phone]    Schedule an appointment as soon as possible for a visit       Shavon Santos MD  [address] [place] [address]  [phone]    Schedule an appointment as soon as possible for a visit            Medication List      New Prescriptions    diclofenac 75 MG EC tablet  Commonly known as: VOLTAREN  Take 1 tablet by mouth 2 (Two) Times a Day.        Changed    * ondansetron ODT 8 MG disintegrating tablet  Commonly known as: Zofran ODT  Place 1 tablet on the tongue Every 8 (Eight) Hours As Needed for Nausea or Vomiting.  What changed: Another medication with the same name was added. Make sure you understand how and when to take each.     * ondansetron ODT 4 MG disintegrating tablet  Commonly known as: Zofran ODT  Place 1 tablet on the tongue Every 8 (Eight) Hours As Needed for Nausea.  What changed: You were already taking a medication with the same name, and this prescription was added. Make sure you understand how and when to take each.         * This list has 2 medication(s) that are the same as other medications prescribed for you. Read the directions carefully, and ask your doctor or other care provider to review them with you.               Where to Get Your Medications      These medications were sent to [facility] [address] [place] [address] [address] [facility] [address] [facility] [address] [facility] [address] - [phone]  - [phone]   [address], [facility] [address]    Phone: [phone]   · diclofenac 75 MG EC tablet  · ondansetron ODT 4 MG disintegrating tablet                    Matt Gibbons, PA  12/03/21 0611

## 2021-12-03 NOTE — ED NOTES
Pt arrives ambulatory in triage with c/o lower abdominal/ pelvic pain (8 of 10), nausea worse over last 4 hr, Dr Santos  @[facility] ER.     Patient noted to be wearing mask throughout entire encounter. Appropriate PPE worn by RN per infection control protocols.       John Butler, RN  12/02/21 4958

## 2021-12-03 NOTE — DISCHARGE INSTRUCTIONS
Home, rest, home medicine as prescribed, follow up with PCP for recheck. Return to care with further concerns.

## 2021-12-13 ENCOUNTER — TELEPHONE (OUTPATIENT)
Dept: INTERNAL MEDICINE | Facility: CLINIC | Age: 56
End: 2021-12-13

## 2021-12-13 NOTE — TELEPHONE ENCOUNTER
Caller: Catie Wright    Relationship to patient: Self    Best call back number: [phone]    Patient is needing: PATIENT CALLED TO SCHEDULE HOSPITAL F/U. PCP HAD NO AVAILABILITY. I ATTEMPTED TO WARM TRANSFER, BUT WAS ASKED TO TAKE A MESSAGE. I DID NOT REALIZE UNTIL AFTER I HUNG UP WITH THE OFFICE THAT THERE WAS ALREADY A NOTE TAKEN OVER A WEEK AGO (SEE PREVIOUS SIGNED ENCOUNTER). HOSPITAL F/U WAS NOT SCHEDULED B/C PATIENT HAD APPT WITH HER GYNOCOLOGIST AND THEY REFERRED HER TO A SPECIALIST, BUT SHE CAN'T GET IN WITH THE SPECIALIST ANYTIME SOON AND IS STILL HAVING ISSUES SO SHE WANTS TO BE SEEN BY DR. SHU ROBLES.

## 2021-12-14 ENCOUNTER — OFFICE VISIT (OUTPATIENT)
Dept: INTERNAL MEDICINE | Facility: CLINIC | Age: 56
End: 2021-12-14

## 2021-12-14 VITALS — HEIGHT: 64 IN | WEIGHT: 153 LBS | TEMPERATURE: 97.1 F | BODY MASS INDEX: 26.12 KG/M2

## 2021-12-14 DIAGNOSIS — Z23 NEED FOR INFLUENZA VACCINATION: ICD-10-CM

## 2021-12-14 DIAGNOSIS — R10.2 SUPRAPUBIC PAIN: Primary | ICD-10-CM

## 2021-12-14 PROCEDURE — 90686 IIV4 VACC NO PRSV 0.5 ML IM: CPT | Performed by: INTERNAL MEDICINE

## 2021-12-14 PROCEDURE — 90471 IMMUNIZATION ADMIN: CPT | Performed by: INTERNAL MEDICINE

## 2021-12-14 PROCEDURE — 99213 OFFICE O/P EST LOW 20 MIN: CPT | Performed by: INTERNAL MEDICINE

## 2021-12-14 RX ORDER — ESTRADIOL 10 UG/1
1 INSERT VAGINAL 2 TIMES WEEKLY
COMMUNITY
End: 2023-03-30 | Stop reason: ALTCHOICE

## 2021-12-14 NOTE — PROGRESS NOTES
"Chief Complaint  Abdominal Pain (ER follow up )    Subjective          Catie Wright presents to [facility]  History of Present Illness  Started having low abdominal pain after seeing gyn NP early November- told her she had some atrophy and \"dropping a little bit\"- started E2 cream thanksgiving week- pain started after that.  Went to see Dr. Santos- u/s negative, switched to vaginal pill instead of cream.  Got bad again, u/a negative-got worse so went to ER- labs, CT normal.  Dr. Santos is sending her to Dr. Celeste Bullock for possible IC.  The pressure is more mild now- suprapubic pressure.  Can't tell if anything is making it worst or better.  Not having intercourse.  Using Advil/Tylenol prn which is not often lately.  Gyn sent her to [facility] for weak pelvic floor.    Objective   Vital Signs:   Temp 97.1 °F (36.2 °C)   Ht 162.6 cm (64\")   Wt 69.4 kg (153 lb)   BMI 26.26 kg/m²     Physical Exam  Constitutional:       Appearance: Normal appearance.   Cardiovascular:      Rate and Rhythm: Normal rate.   Pulmonary:      Effort: Pulmonary effort is normal.   Abdominal:      General: Bowel sounds are normal.      Tenderness: There is no abdominal tenderness. There is no guarding.   Musculoskeletal:         General: No swelling or tenderness. Normal range of motion.   Skin:     General: Skin is warm and dry.        Result Review :                 Assessment and Plan    Diagnoses and all orders for this visit:    1. Suprapubic pain (Primary)  Comments:  reviewed differential- agree with ? IC, prolapse vs pelvic floor - avoid triggers, drink water only.     2. Need for influenza vaccination  -     FluLaval/Fluarix/Fluzone >6 Months ([id number])        Follow Up   No follow-ups on file.  Patient was given instructions and counseling regarding her condition or for health maintenance advice. Please see specific information pulled into the AVS if appropriate.       "

## 2022-02-16 ENCOUNTER — TELEPHONE (OUTPATIENT)
Dept: INTERNAL MEDICINE | Facility: CLINIC | Age: 57
End: 2022-02-16

## 2022-02-16 NOTE — TELEPHONE ENCOUNTER
Patient is going to a [facility] for her labs the week of 03/14/2022 if you would please put the labs in for her physical.  Thank you

## 2022-02-21 DIAGNOSIS — Z00.00 ANNUAL PHYSICAL EXAM: Primary | ICD-10-CM

## 2022-03-16 LAB
BASOPHILS # BLD AUTO: 0 X10E3/UL (ref 0–0.2)
BASOPHILS NFR BLD AUTO: 1 %
EOSINOPHIL # BLD AUTO: 0.1 X10E3/UL (ref 0–0.4)
EOSINOPHIL NFR BLD AUTO: 2 %
ERYTHROCYTE [DISTWIDTH] IN BLOOD BY AUTOMATED COUNT: 12.8 % (ref 11.7–15.4)
HCT VFR BLD AUTO: 41.8 % (ref 34–46.6)
HGB BLD-MCNC: 14.6 G/DL (ref 11.1–15.9)
IMM GRANULOCYTES # BLD AUTO: 0 X10E3/UL (ref 0–0.1)
IMM GRANULOCYTES NFR BLD AUTO: 0 %
LYMPHOCYTES # BLD AUTO: 1.6 X10E3/UL (ref 0.7–3.1)
LYMPHOCYTES NFR BLD AUTO: 38 %
MCH RBC QN AUTO: 30.7 PG (ref 26.6–33)
MCHC RBC AUTO-ENTMCNC: 34.9 G/DL (ref 31.5–35.7)
MCV RBC AUTO: 88 FL (ref 79–97)
MONOCYTES # BLD AUTO: 0.3 X10E3/UL (ref 0.1–0.9)
MONOCYTES NFR BLD AUTO: 7 %
NEUTROPHILS # BLD AUTO: 2.2 X10E3/UL (ref 1.4–7)
NEUTROPHILS NFR BLD AUTO: 52 %
PLATELET # BLD AUTO: 184 X10E3/UL (ref 150–450)
RBC # BLD AUTO: 4.75 X10E6/UL (ref 3.77–5.28)
WBC # BLD AUTO: 4.2 X10E3/UL (ref 3.4–10.8)

## 2022-03-24 ENCOUNTER — OFFICE VISIT (OUTPATIENT)
Dept: INTERNAL MEDICINE | Facility: CLINIC | Age: 57
End: 2022-03-24

## 2022-03-24 VITALS
HEART RATE: 70 BPM | WEIGHT: 154 LBS | BODY MASS INDEX: 26.29 KG/M2 | TEMPERATURE: 97.3 F | SYSTOLIC BLOOD PRESSURE: 104 MMHG | HEIGHT: 64 IN | DIASTOLIC BLOOD PRESSURE: 74 MMHG

## 2022-03-24 DIAGNOSIS — R10.2 PELVIC PAIN: Primary | ICD-10-CM

## 2022-03-24 DIAGNOSIS — Z11.59 ENCOUNTER FOR HEPATITIS C SCREENING TEST FOR LOW RISK PATIENT: ICD-10-CM

## 2022-03-24 DIAGNOSIS — Z00.00 PHYSICAL EXAM, ANNUAL: ICD-10-CM

## 2022-03-24 DIAGNOSIS — N90.7 EPIDERMOID CYST OF LABIA MAJORA: ICD-10-CM

## 2022-03-24 PROCEDURE — 99396 PREV VISIT EST AGE 40-64: CPT | Performed by: INTERNAL MEDICINE

## 2022-11-16 ENCOUNTER — APPOINTMENT (OUTPATIENT)
Dept: WOMENS IMAGING | Facility: HOSPITAL | Age: 57
End: 2022-11-16

## 2022-11-16 PROCEDURE — 77067 SCR MAMMO BI INCL CAD: CPT | Performed by: RADIOLOGY

## 2022-11-16 PROCEDURE — 77063 BREAST TOMOSYNTHESIS BI: CPT | Performed by: RADIOLOGY

## 2023-01-24 ENCOUNTER — OFFICE (OUTPATIENT)
Dept: URBAN - METROPOLITAN AREA CLINIC 76 | Facility: CLINIC | Age: 58
End: 2023-01-24

## 2023-01-24 VITALS
HEART RATE: 65 BPM | DIASTOLIC BLOOD PRESSURE: 88 MMHG | SYSTOLIC BLOOD PRESSURE: 133 MMHG | WEIGHT: 157 LBS | HEIGHT: 63 IN | OXYGEN SATURATION: 99 %

## 2023-01-24 DIAGNOSIS — K59.00 CONSTIPATION, UNSPECIFIED: ICD-10-CM

## 2023-01-24 DIAGNOSIS — R19.7 DIARRHEA, UNSPECIFIED: ICD-10-CM

## 2023-01-24 DIAGNOSIS — R15.0 INCOMPLETE DEFECATION: ICD-10-CM

## 2023-01-24 DIAGNOSIS — K57.90 DIVERTICULOSIS OF INTESTINE, PART UNSPECIFIED, WITHOUT PERFO: ICD-10-CM

## 2023-01-24 PROCEDURE — 99202 OFFICE O/P NEW SF 15 MIN: CPT | Performed by: INTERNAL MEDICINE

## 2023-02-01 ENCOUNTER — TELEPHONE (OUTPATIENT)
Dept: INTERNAL MEDICINE | Facility: CLINIC | Age: 58
End: 2023-02-01

## 2023-02-01 NOTE — TELEPHONE ENCOUNTER
Hub staff attempted to follow warm transfer process and was unsuccessful     Caller: Catie Wright    Relationship to patient: Self    Best call back number: [phone]     Patient is needing: PATIENT HAS QUESTIONS REGARDING HER LAB APPOINTMENT IN MARCH. SHE WOULD LIKE TO KNOW IF THERE IS A LAB IN THE OFFICE THAT SHE GOES TO, OR IF THE LAB IS LOCATED ELSEWHERE.

## 2023-03-30 ENCOUNTER — OFFICE VISIT (OUTPATIENT)
Dept: INTERNAL MEDICINE | Facility: CLINIC | Age: 58
End: 2023-03-30
Payer: COMMERCIAL

## 2023-03-30 VITALS
WEIGHT: 147 LBS | BODY MASS INDEX: 25.1 KG/M2 | DIASTOLIC BLOOD PRESSURE: 78 MMHG | SYSTOLIC BLOOD PRESSURE: 134 MMHG | HEIGHT: 64 IN | HEART RATE: 80 BPM

## 2023-03-30 DIAGNOSIS — Z00.00 PHYSICAL EXAM, ANNUAL: ICD-10-CM

## 2023-03-30 DIAGNOSIS — B37.31 VAGINAL CANDIDIASIS: Primary | ICD-10-CM

## 2023-03-30 DIAGNOSIS — E78.5 BORDERLINE HYPERLIPIDEMIA: ICD-10-CM

## 2023-03-30 DIAGNOSIS — N94.819 VULVODYNIA, UNSPECIFIED: ICD-10-CM

## 2023-03-30 PROBLEM — L40.9 PSORIASIS: Status: ACTIVE | Noted: 2023-03-30

## 2023-03-30 PROBLEM — K63.5 COLON POLYPS: Status: ACTIVE | Noted: 2023-03-30

## 2023-03-30 PROBLEM — M54.9 BACK PAIN: Status: RESOLVED | Noted: 2023-03-30 | Resolved: 2023-03-30

## 2023-03-30 PROBLEM — K57.90 DIVERTICULOSIS: Status: ACTIVE | Noted: 2023-03-30

## 2023-03-30 PROBLEM — M25.9 DISORDER OF HIP REGION: Status: ACTIVE | Noted: 2017-06-14

## 2023-03-30 PROBLEM — N81.6 RECTOCELE: Status: ACTIVE | Noted: 2023-03-30

## 2023-03-30 PROBLEM — M54.9 BACK PAIN: Status: ACTIVE | Noted: 2023-03-30

## 2023-03-30 RX ORDER — ESTRADIOL 0.1 MG/G
2 CREAM VAGINAL DAILY
COMMUNITY

## 2023-03-30 RX ORDER — FLUCONAZOLE 100 MG/1
100 TABLET ORAL DAILY
Qty: 5 TABLET | Refills: 0 | Status: SHIPPED | OUTPATIENT
Start: 2023-03-30

## 2023-03-30 NOTE — PROGRESS NOTES
Subjective   Catie Wright is a 57 y.o. female and is here for a comprehensive physical exam. The patient reports problems - see below.    Pt is UTD with annual gyn exam and mammo   Melanoma removed L upper arm 9/22 (Bryce Gutierrez) 0.3 mm- has regular surveillance with him.   Still dealing with vaginal pain issues- Dr. Shavon Whitmore helping her.   Problems with bowel habits for years- alt constipation and diarrhea- got worse with menopause - saw a naturopath who put her on supplements- helped her a lot. She is on probiotics but wants to work on changing her diet to help instead of relying on them.   Had ear infection - treated ICC with abx and developed yeast infection. Took Diflucan at end of course and then again 5 days later. (13th and 18th). Does not feel like it went away. General irritation, itching.     Social History     Socioeconomic History   • Marital status:    Tobacco Use   • Smoking status: Never   • Smokeless tobacco: Former     Quit date: 1990   Vaping Use   • Vaping Use: Never used   Substance and Sexual Activity   • Alcohol use: Yes     Comment: OCCASIONALLY   • Drug use: No   • Sexual activity: Yes     Partners: Male     Birth control/protection: Surgical     Comment: vassectomy       Family History   Problem Relation Age of Onset   • Migraines Mother    • Stroke Mother 79   • Atrial fibrillation Mother    • Diabetes Father    • Hypertension Father    • Diabetes Sister    • Mental illness Sister    • Colon cancer Brother 59        lung, colon   • Skin cancer Brother    • No Known Problems Brother         History reviewed. No pertinent past medical history.       Current Outpatient Medications:   •  Cholecalciferol (Vitamin D-3) 125 MCG (5000 UT) tablet, Take 1 tablet by mouth Daily., Disp: , Rfl:   •  estradiol (ESTRACE) 0.1 MG/GM vaginal cream, Insert 2 g into the vagina Daily., Disp: , Rfl:   •  Lactobacillus (PROBIOTIC ACIDOPHILUS PO), Take  by mouth., Disp: , Rfl:   •  fluconazole  "(Diflucan) 100 MG tablet, Take 1 tablet by mouth Daily., Disp: 5 tablet, Rfl: 0    Review of Systems    Review of Systems   Constitutional: Negative for fatigue and unexpected weight loss.   HENT: Negative for trouble swallowing.    Eyes: Negative for visual disturbance.   Respiratory: Negative for shortness of breath.    Cardiovascular: Negative for chest pain and palpitations.   Gastrointestinal: Negative for abdominal pain and blood in stool.   Endocrine: Negative for cold intolerance.   Genitourinary: Positive for decreased libido, pelvic pressure and vaginal discharge. Negative for breast lump and difficulty urinating.   Musculoskeletal: Negative for arthralgias and gait problem.   Neurological: Negative for memory problem.   Psychiatric/Behavioral: Negative for depressed mood.        There were no vitals filed for this visit.    Vitals:    03/30/23 0837   BP: 134/78   Pulse: 80   Weight: 66.7 kg (147 lb)   Height: 162.6 cm (64\")     Body mass index is 25.23 kg/m².  Wt Readings from Last 3 Encounters:   03/30/23 66.7 kg (147 lb)   03/16/23 68 kg (150 lb)   02/20/23 68 kg (150 lb)      Objective     Physical Exam  Exam conducted with a chaperone present.   Constitutional:       General: She is not in acute distress.  Eyes:      Conjunctiva/sclera: Conjunctivae normal.   Neck:      Thyroid: No thyromegaly.   Cardiovascular:      Rate and Rhythm: Normal rate and regular rhythm.      Heart sounds: Normal heart sounds.   Pulmonary:      Effort: Pulmonary effort is normal.      Breath sounds: Normal breath sounds.   Abdominal:      General: Bowel sounds are normal.      Palpations: Abdomen is soft.   Genitourinary:     Vagina: Vaginal discharge (thick, white) present.   Musculoskeletal:      Right lower leg: No edema.      Left lower leg: No edema.   Lymphadenopathy:      Cervical: No cervical adenopathy.   Skin:     General: Skin is warm and dry.   Neurological:      Mental Status: She is alert and oriented to " person, place, and time.   Psychiatric:         Behavior: Behavior normal.         Thought Content: Thought content normal.         Judgment: Judgment normal.         Procedures       Assessment & Plan         Diagnoses and all orders for this visit:    1. Vaginal candidiasis (Primary)  Comments:  related to recent abx use- will treat and reassess as needed.     2. Borderline hyperlipidemia  Comments:  TAG related to ice cream, otherwise unchanged- discussed dietary changes and exercise to further reduce vascular risks.     3. Vulvodynia, unspecified  Comments:  getting good tx and evaluation.     4. Physical exam, annual  Comments:  rec Shingrix vaccine. Also discussed better diet instead of multiple supplements- also discussed adding exercise- for bone health as well. Recheck 6m/prn    Other orders  -     fluconazole (Diflucan) 100 MG tablet; Take 1 tablet by mouth Daily.  Dispense: 5 tablet; Refill: 0        Return in about 6 months (around 9/30/2023) for Recheck.

## 2023-04-12 ENCOUNTER — OFFICE (OUTPATIENT)
Dept: URBAN - METROPOLITAN AREA CLINIC 76 | Facility: CLINIC | Age: 58
End: 2023-04-12

## 2023-04-12 VITALS
SYSTOLIC BLOOD PRESSURE: 132 MMHG | WEIGHT: 153 LBS | OXYGEN SATURATION: 97 % | HEART RATE: 65 BPM | DIASTOLIC BLOOD PRESSURE: 70 MMHG | HEIGHT: 63 IN

## 2023-04-12 DIAGNOSIS — K59.00 CONSTIPATION, UNSPECIFIED: ICD-10-CM

## 2023-04-12 DIAGNOSIS — N81.6 RECTOCELE: ICD-10-CM

## 2023-04-12 DIAGNOSIS — K64.9 UNSPECIFIED HEMORRHOIDS: ICD-10-CM

## 2023-04-12 DIAGNOSIS — Z87.891 PERSONAL HISTORY OF NICOTINE DEPENDENCE: ICD-10-CM

## 2023-04-12 DIAGNOSIS — K57.90 DIVERTICULOSIS OF INTESTINE, PART UNSPECIFIED, WITHOUT PERFO: ICD-10-CM

## 2023-04-12 PROCEDURE — 99214 OFFICE O/P EST MOD 30 MIN: CPT

## 2023-04-12 RX ORDER — HYDROCORTISONE ACETATE 25 MG/1
25 SUPPOSITORY RECTAL
Qty: 10 | Refills: 1 | Status: ACTIVE
Start: 2023-04-12

## 2023-09-12 ENCOUNTER — TELEPHONE (OUTPATIENT)
Dept: INTERNAL MEDICINE | Facility: CLINIC | Age: 58
End: 2023-09-12
Payer: COMMERCIAL

## 2023-09-12 NOTE — TELEPHONE ENCOUNTER
Patient is calling to confirm that she does not need labs for her next appointment with Dr. Dupree, no labs ordered in the system, patient had her yearly exam back in March of this year (2023), please advise?  [phone]

## 2023-10-04 ENCOUNTER — OFFICE VISIT (OUTPATIENT)
Dept: INTERNAL MEDICINE | Facility: CLINIC | Age: 58
End: 2023-10-04
Payer: COMMERCIAL

## 2023-10-04 VITALS
WEIGHT: 149 LBS | DIASTOLIC BLOOD PRESSURE: 70 MMHG | BODY MASS INDEX: 25.44 KG/M2 | HEART RATE: 78 BPM | SYSTOLIC BLOOD PRESSURE: 130 MMHG | HEIGHT: 64 IN

## 2023-10-04 DIAGNOSIS — H92.01 RIGHT EAR PAIN: Primary | ICD-10-CM

## 2023-10-04 DIAGNOSIS — N94.819 VULVODYNIA, UNSPECIFIED: ICD-10-CM

## 2023-10-04 PROBLEM — J02.9 ACUTE PHARYNGITIS: Status: ACTIVE | Noted: 2023-10-04

## 2023-10-04 PROCEDURE — 99213 OFFICE O/P EST LOW 20 MIN: CPT | Performed by: INTERNAL MEDICINE

## 2023-12-20 ENCOUNTER — OFFICE VISIT (OUTPATIENT)
Dept: INTERNAL MEDICINE | Facility: CLINIC | Age: 58
End: 2023-12-20
Payer: COMMERCIAL

## 2023-12-20 VITALS
BODY MASS INDEX: 26.12 KG/M2 | SYSTOLIC BLOOD PRESSURE: 132 MMHG | WEIGHT: 153 LBS | HEIGHT: 64 IN | HEART RATE: 84 BPM | DIASTOLIC BLOOD PRESSURE: 74 MMHG

## 2023-12-20 DIAGNOSIS — K52.9 GE (GASTROENTERITIS): Primary | ICD-10-CM

## 2023-12-20 DIAGNOSIS — N94.819 VULVODYNIA, UNSPECIFIED: ICD-10-CM

## 2023-12-20 PROCEDURE — 99213 OFFICE O/P EST LOW 20 MIN: CPT | Performed by: INTERNAL MEDICINE

## 2023-12-20 RX ORDER — OMEGA-3 FATTY ACIDS/FISH OIL 300-1000MG
CAPSULE ORAL
COMMUNITY

## 2023-12-20 NOTE — PROGRESS NOTES
"Chief Complaint  GI Problem    Subjective        Catie Wright presents to [facility]  History of Present Illness 1 week ago went out to dinner- early Thursday into Friday had significant upper abd and L sided abdominal pain- diarrhea, chills, nausea without vomiting. Lasted about 2+ days.  Has slowly gotten better, stools better, had some gassiness but that is better too. Appetite is better as well.     Objective   Vital Signs:  /74   Pulse 84   Ht 162.6 cm (64\")   Wt 69.4 kg (153 lb)   BMI 26.26 kg/m²   Estimated body mass index is 26.26 kg/m² as calculated from the following:    Height as of this encounter: 162.6 cm (64\").    Weight as of this encounter: 69.4 kg (153 lb).             Physical Exam  Constitutional:       Appearance: Normal appearance.   Cardiovascular:      Rate and Rhythm: Normal rate and regular rhythm.   Pulmonary:      Effort: Pulmonary effort is normal.   Abdominal:      General: Bowel sounds are normal. There is no distension.      Tenderness: There is no abdominal tenderness.        Result Review :                   Assessment and Plan   Diagnoses and all orders for this visit:    1. GE (gastroenteritis) (Primary)  Comments:  reassured- will call if recurs.    2. Vulvodynia, unspecified  Comments:  reviewed Dr. Estrella's notes- agree with plan for PFPT.             Follow Up   No follow-ups on file.  Patient was given instructions and counseling regarding her condition or for health maintenance advice. Please see specific information pulled into the AVS if appropriate.         "

## 2024-04-03 DIAGNOSIS — Z00.00 ANNUAL PHYSICAL EXAM: Primary | ICD-10-CM

## 2024-04-03 LAB
ALBUMIN SERPL-MCNC: 4.5 G/DL (ref 3.5–5.2)
ALBUMIN/GLOB SERPL: 2 G/DL
ALP SERPL-CCNC: 61 U/L (ref 39–117)
ALT SERPL-CCNC: 20 U/L (ref 1–33)
AST SERPL-CCNC: 16 U/L (ref 1–32)
BASOPHILS # BLD AUTO: 0.02 10*3/MM3 (ref 0–0.2)
BASOPHILS NFR BLD AUTO: 0.5 % (ref 0–1.5)
BILIRUB SERPL-MCNC: 0.6 MG/DL (ref 0–1.2)
BUN SERPL-MCNC: 9 MG/DL (ref 8–23)
BUN/CREAT SERPL: 9.4 (ref 7–25)
CALCIUM SERPL-MCNC: 9.4 MG/DL (ref 8.6–10.5)
CHLORIDE SERPL-SCNC: 103 MMOL/L (ref 98–107)
CHOLEST SERPL-MCNC: 249 MG/DL (ref 0–200)
CO2 SERPL-SCNC: 27 MMOL/L (ref 22–29)
CREAT SERPL-MCNC: 0.96 MG/DL (ref 0.57–1)
EGFRCR SERPLBLD CKD-EPI 2021: 67.9 ML/MIN/1.73
EOSINOPHIL # BLD AUTO: 0.07 10*3/MM3 (ref 0–0.4)
EOSINOPHIL NFR BLD AUTO: 1.6 % (ref 0.3–6.2)
ERYTHROCYTE [DISTWIDTH] IN BLOOD BY AUTOMATED COUNT: 13 % (ref 12.3–15.4)
GLOBULIN SER CALC-MCNC: 2.2 GM/DL
GLUCOSE SERPL-MCNC: 89 MG/DL (ref 65–99)
HCT VFR BLD AUTO: 41.9 % (ref 34–46.6)
HDLC SERPL-MCNC: 52 MG/DL (ref 40–60)
HGB BLD-MCNC: 14.3 G/DL (ref 12–15.9)
IMM GRANULOCYTES # BLD AUTO: 0.01 10*3/MM3 (ref 0–0.05)
IMM GRANULOCYTES NFR BLD AUTO: 0.2 % (ref 0–0.5)
LDLC SERPL CALC-MCNC: 161 MG/DL (ref 0–100)
LYMPHOCYTES # BLD AUTO: 1.4 10*3/MM3 (ref 0.7–3.1)
LYMPHOCYTES NFR BLD AUTO: 32 % (ref 19.6–45.3)
MCH RBC QN AUTO: 30.6 PG (ref 26.6–33)
MCHC RBC AUTO-ENTMCNC: 34.1 G/DL (ref 31.5–35.7)
MCV RBC AUTO: 89.5 FL (ref 79–97)
MONOCYTES # BLD AUTO: 0.29 10*3/MM3 (ref 0.1–0.9)
MONOCYTES NFR BLD AUTO: 6.6 % (ref 5–12)
NEUTROPHILS # BLD AUTO: 2.59 10*3/MM3 (ref 1.7–7)
NEUTROPHILS NFR BLD AUTO: 59.1 % (ref 42.7–76)
NRBC BLD AUTO-RTO: 0 /100 WBC (ref 0–0.2)
PLATELET # BLD AUTO: 181 10*3/MM3 (ref 140–450)
POTASSIUM SERPL-SCNC: 4.4 MMOL/L (ref 3.5–5.2)
PROT SERPL-MCNC: 6.7 G/DL (ref 6–8.5)
RBC # BLD AUTO: 4.68 10*6/MM3 (ref 3.77–5.28)
SODIUM SERPL-SCNC: 140 MMOL/L (ref 136–145)
TRIGL SERPL-MCNC: 198 MG/DL (ref 0–150)
VLDLC SERPL CALC-MCNC: 36 MG/DL (ref 5–40)
WBC # BLD AUTO: 4.38 10*3/MM3 (ref 3.4–10.8)

## 2024-04-08 ENCOUNTER — OFFICE VISIT (OUTPATIENT)
Dept: INTERNAL MEDICINE | Facility: CLINIC | Age: 59
End: 2024-04-08
Payer: COMMERCIAL

## 2024-04-08 VITALS
BODY MASS INDEX: 27.29 KG/M2 | WEIGHT: 154 LBS | HEART RATE: 78 BPM | HEIGHT: 63 IN | SYSTOLIC BLOOD PRESSURE: 122 MMHG | DIASTOLIC BLOOD PRESSURE: 80 MMHG

## 2024-04-08 DIAGNOSIS — M62.89 PELVIC FLOOR DYSFUNCTION: Chronic | ICD-10-CM

## 2024-04-08 DIAGNOSIS — Z00.00 PHYSICAL EXAM, ANNUAL: ICD-10-CM

## 2024-04-08 DIAGNOSIS — E78.5 BORDERLINE HYPERLIPIDEMIA: Primary | ICD-10-CM

## 2024-04-08 PROCEDURE — 99396 PREV VISIT EST AGE 40-64: CPT | Performed by: INTERNAL MEDICINE

## 2024-04-08 NOTE — PROGRESS NOTES
Subjective   Catie Wright is a 58 y.o. female and is here for a comprehensive physical exam. The patient reports problems - pelvic floor dysfunction .    Pt is UTD with annual gyn exam and mammo - Dr. Leung.   Did another round of PT for her pelvic floor dysfunction- also recommend she make sure her bowels are regular and easy.     Having some R ear pain- worse after having extensive dental work done, teeth extracted and implants put in- causes pain around and behind ear in to R eye. Worse in am. Does wear a  at night. Tries to chew on the L side.       Social History     Socioeconomic History    Marital status:    Tobacco Use    Smoking status: Never     Passive exposure: Never    Smokeless tobacco: Former     Quit date: 1990   Vaping Use    Vaping status: Never Used   Substance and Sexual Activity    Alcohol use: Yes     Comment: OCCASIONALLY    Drug use: No    Sexual activity: Yes     Partners: Male     Birth control/protection: Surgical     Comment: vassectomy       Family History   Problem Relation Age of Onset    Migraines Mother     Stroke Mother 79    Atrial fibrillation Mother     Diabetes Father     Hypertension Father     Diabetes Sister     Mental illness Sister     Colon cancer Brother 59        lung, colon    Skin cancer Brother     No Known Problems Brother         History reviewed. No pertinent past medical history.       Current Outpatient Medications:     ASHWAGANDHA PO, Take 1 tablet by mouth Daily., Disp: , Rfl:     Cholecalciferol (Vitamin D-3) 125 MCG (5000 UT) tablet, Take 1 tablet by mouth Daily., Disp: , Rfl:     estradiol (ESTRACE) 0.1 MG/GM vaginal cream, Insert 2 g into the vagina Daily., Disp: , Rfl:     Lactobacillus (PROBIOTIC ACIDOPHILUS PO), Take  by mouth., Disp: , Rfl:     Omega 3 1000 MG capsule, Take  by mouth., Disp: , Rfl:     Review of Systems    Review of Systems   Constitutional:  Negative for fatigue and unexpected weight loss.   HENT:  Positive for ear  "pain. Negative for swollen glands and trouble swallowing.    Eyes:  Negative for visual disturbance.   Respiratory:  Negative for shortness of breath.    Cardiovascular:  Negative for chest pain and palpitations.   Gastrointestinal:  Negative for abdominal pain and blood in stool.   Endocrine: Negative for cold intolerance.   Genitourinary:  Positive for pelvic pain. Negative for breast lump and decreased libido.   Musculoskeletal:  Negative for arthralgias and gait problem.   Neurological:  Negative for memory problem.   Psychiatric/Behavioral:  Negative for depressed mood.         There were no vitals filed for this visit.    Vitals:    04/08/24 1454   BP: 122/80   Pulse: 78   Weight: 69.9 kg (154 lb)   Height: 160 cm (63\")     Body mass index is 27.28 kg/m².  Wt Readings from Last 3 Encounters:   04/08/24 69.9 kg (154 lb)   03/21/24 68 kg (150 lb)   12/21/23 69.4 kg (153 lb)      Objective     Physical Exam  Constitutional:       General: She is not in acute distress.  HENT:      Right Ear: Tympanic membrane and ear canal normal.      Left Ear: Tympanic membrane and ear canal normal.      Nose: Nose normal.      Mouth/Throat:      Mouth: Mucous membranes are moist.      Pharynx: Oropharynx is clear.      Comments: Tender R jaw  Eyes:      Conjunctiva/sclera: Conjunctivae normal.   Neck:      Thyroid: No thyromegaly.   Cardiovascular:      Rate and Rhythm: Normal rate and regular rhythm.      Heart sounds: Normal heart sounds.   Pulmonary:      Effort: Pulmonary effort is normal.      Breath sounds: Normal breath sounds.   Abdominal:      General: Bowel sounds are normal.      Palpations: Abdomen is soft.   Musculoskeletal:      Right lower leg: No edema.      Left lower leg: No edema.   Lymphadenopathy:      Cervical: No cervical adenopathy.   Skin:     General: Skin is warm and dry.   Neurological:      Mental Status: She is alert and oriented to person, place, and time.   Psychiatric:         Behavior: Behavior " normal.         Thought Content: Thought content normal.         Judgment: Judgment normal.         Procedures  The 10-year ASCVD risk score (Beckie YANEZ, et al., 2019) is: 3.7%    Values used to calculate the score:      Age: 58 years      Sex: Female      Is Non- : No      Diabetic: No      Tobacco smoker: No      Systolic Blood Pressure: 122 mmHg      Is BP treated: No      HDL Cholesterol: 52 mg/dL      Total Cholesterol: 249 mg/dL       Assessment & Plan         Diagnoses and all orders for this visit:    1. Borderline hyperlipidemia (Primary)  Comments:  remains low risk given pooled cohort equation- she would like to work on diet/exercise and recheck in 6 m.  Orders:  -     Comprehensive Metabolic Panel; Future  -     Lipid Panel With / Chol / HDL Ratio; Future    2. Pelvic floor dysfunction  Comments:  Some improvement with PT, to cont same.    3. Physical exam, annual  Comments:  Exam is favorable and labs reviewed- discussed increase in exercise - also plans Shingrix vaccines.        Return in about 6 months (around 10/8/2024) for Recheck, Lab Before FUP.

## 2024-04-09 PROBLEM — M25.9 DISORDER OF HIP REGION: Status: RESOLVED | Noted: 2017-06-14 | Resolved: 2024-04-09

## 2024-04-09 PROBLEM — J02.9 ACUTE PHARYNGITIS: Status: RESOLVED | Noted: 2023-10-04 | Resolved: 2024-04-09

## 2024-05-31 ENCOUNTER — PATIENT ROUNDING (BHMG ONLY) (OUTPATIENT)
Dept: INTERNAL MEDICINE | Facility: CLINIC | Age: 59
End: 2024-05-31
Payer: COMMERCIAL

## 2024-05-31 ENCOUNTER — OFFICE VISIT (OUTPATIENT)
Dept: INTERNAL MEDICINE | Facility: CLINIC | Age: 59
End: 2024-05-31
Payer: COMMERCIAL

## 2024-05-31 VITALS
HEIGHT: 63 IN | WEIGHT: 156.4 LBS | HEART RATE: 66 BPM | DIASTOLIC BLOOD PRESSURE: 80 MMHG | SYSTOLIC BLOOD PRESSURE: 118 MMHG | OXYGEN SATURATION: 99 % | BODY MASS INDEX: 27.71 KG/M2 | TEMPERATURE: 96.9 F

## 2024-05-31 DIAGNOSIS — R19.7 DIARRHEA, UNSPECIFIED TYPE: Primary | ICD-10-CM

## 2024-05-31 DIAGNOSIS — R14.0 ABDOMINAL BLOATING: ICD-10-CM

## 2024-05-31 DIAGNOSIS — R19.7 NOCTURNAL DIARRHEA: ICD-10-CM

## 2024-05-31 DIAGNOSIS — R15.2 FECAL URGENCY: ICD-10-CM

## 2024-05-31 DIAGNOSIS — K90.9 STEATORRHEA: ICD-10-CM

## 2024-05-31 DIAGNOSIS — R19.7 DIARRHEA, UNSPECIFIED TYPE: ICD-10-CM

## 2024-05-31 PROCEDURE — 99214 OFFICE O/P EST MOD 30 MIN: CPT | Performed by: NURSE PRACTITIONER

## 2024-05-31 NOTE — PROGRESS NOTES
Chief Complaint  GI Problem    Subha Wright presents to [facility]  History of Present Illness  Here with constipation now with loose stools with bloating and feeling of fullness.     She has pelvic floor dysfunction which has helped. Has been traveling quite a bit.     Has personal history of adenomatous colonic polyps from previous. Last colonoscopy in the EHR is from 2020 with Dr. Mark Bradley.  Postop diagnosis includes diverticulosis of the sigmoid colon and in the descending colon.  He was recommended to start a high-fiber diet and repeat colonoscopy in 5 years for surveillance. Her brother had colon cancer (). Her mother has unspecified GI issues. No IBD.     She usually will pass 1 BM every morning, but until the past month has had some changes in her bowel pattern. She usually takes a probiotic, trying to increase her dietary fiber through food. No fiber supplement at this time. Her stool prior to physical therapy was about 1-2 on [place] scale, then got to 3-4 and for the past month has been 5-7 on [place] scale. She has been to [place] in April. Went out to [place] and [place]. She has also been to [place]. No foreign travel.   Last 2023 had multiple dental procedures was on multiple antibiotics, including amoxicillin, a Z-khadijah. Last BM was this AM, about a 5.   She has had nighttime fecal urgency with some fecal incontinence. This has been about 4-5 times in the past month. No BRBPR and no melena.  Started taking over-the-counter Imodium as needed which does provide some relief.    Lower GI symptoms: bloating, mid abdomen discomfort and feeling full. She is still passing gas. Still has gallbladder. No very odiferous stools, sometimes appear greasy.     Upper GI: she has had some nausea without vomiting, this has ceased.     She has never been evaluated by gastroenterologist.      Objective   Vital Signs:  BP  "118/80   Pulse 66   Temp 96.9 °F (36.1 °C) (Infrared)   Ht 160 cm (63\")   Wt 70.9 kg (156 lb 6.4 oz)   SpO2 99%   BMI 27.71 kg/m²   Estimated body mass index is 27.71 kg/m² as calculated from the following:    Height as of this encounter: 160 cm (63\").    Weight as of this encounter: 70.9 kg (156 lb 6.4 oz).       BMI is >= 25 and <30. (Overweight) The following options were offered after discussion;: not discussed today.       Physical Exam  Vitals reviewed.   Constitutional:       Appearance: Normal appearance.   Cardiovascular:      Rate and Rhythm: Normal rate and regular rhythm.      Pulses: Normal pulses.      Heart sounds: Normal heart sounds.   Pulmonary:      Effort: Pulmonary effort is normal.      Breath sounds: Normal breath sounds.   Abdominal:      General: Bowel sounds are normal. There is no distension.      Palpations: Abdomen is soft.      Tenderness: There is no abdominal tenderness. There is no guarding or rebound.   Skin:     General: Skin is warm and dry.      Coloration: Skin is not jaundiced.   Neurological:      Mental Status: She is alert.        Result Review :                   Assessment and Plan   Patient is a very pleasant 58-year-old female with personal history colon polyps, diverticulosis, history of rectocele, pelvic floor dysfunction, chondromalacia, psoriasis here for further evaluation chronic diarrhea over the past month        Diagnoses and all orders for this visit:    1. Diarrhea, unspecified type (Primary)  -     Ambulatory Referral to Gastroenterology  -     Cancel: CT Chest Without Contrast; Future  -     Amylase; Future  -     CBC and differential; Future  -     Comprehensive metabolic panel; Future  -     Clostridioides difficile Toxin, PCR - Stool, Per Rectum  -     Fecal Leukocytes - Stool, Per Rectum; Future  -     Stool Culture (Reference Lab) - Stool, Per Rectum; Future  -     CT abdomen with and without contrast; Future    2. Fecal urgency  -     Ambulatory " Referral to Gastroenterology    3. Nocturnal diarrhea  Comments:  Continue to take OTC Imodium as needed and add BeneFiber daily to help bulk up stool .  Orders:  -     Ambulatory Referral to Gastroenterology    4. Abdominal bloating  -     Cancel: CT Chest Without Contrast; Future  -     CT abdomen with and without contrast; Future    5. Steatorrhea  -     Pancreatic Elastase, Fecal - Stool, Per Rectum; Future             Follow Up   Return if symptoms worsen or fail to improve.  Patient was given instructions and counseling regarding her condition or for health maintenance advice. Please see specific information pulled into the AVS if appropriate.

## 2024-05-31 NOTE — PROGRESS NOTES
A My-Chart message has been sent to the patient for [facility].

## 2024-06-01 LAB
ALBUMIN SERPL-MCNC: 4.5 G/DL (ref 3.8–4.9)
ALBUMIN/GLOB SERPL: 2.1 {RATIO} (ref 1.2–2.2)
ALP SERPL-CCNC: 59 IU/L (ref 44–121)
ALT SERPL-CCNC: 20 IU/L (ref 0–32)
AMYLASE SERPL-CCNC: 29 U/L (ref 31–110)
AST SERPL-CCNC: 18 IU/L (ref 0–40)
BASOPHILS # BLD AUTO: 0 X10E3/UL (ref 0–0.2)
BASOPHILS NFR BLD AUTO: 0 %
BILIRUB SERPL-MCNC: 0.3 MG/DL (ref 0–1.2)
BUN SERPL-MCNC: 11 MG/DL (ref 8–27)
BUN/CREAT SERPL: 14 (ref 12–28)
CALCIUM SERPL-MCNC: 9.3 MG/DL (ref 8.7–10.3)
CHLORIDE SERPL-SCNC: 103 MMOL/L (ref 96–106)
CO2 SERPL-SCNC: 26 MMOL/L (ref 20–29)
CREAT SERPL-MCNC: 0.77 MG/DL (ref 0.57–1)
EGFRCR SERPLBLD CKD-EPI 2021: 88 ML/MIN/1.73
EOSINOPHIL # BLD AUTO: 0.1 X10E3/UL (ref 0–0.4)
EOSINOPHIL NFR BLD AUTO: 2 %
ERYTHROCYTE [DISTWIDTH] IN BLOOD BY AUTOMATED COUNT: 13 % (ref 11.7–15.4)
GLOBULIN SER CALC-MCNC: 2.1 G/DL (ref 1.5–4.5)
GLUCOSE SERPL-MCNC: 85 MG/DL (ref 70–99)
HCT VFR BLD AUTO: 40.4 % (ref 34–46.6)
HGB BLD-MCNC: 14.1 G/DL (ref 11.1–15.9)
IMM GRANULOCYTES # BLD AUTO: 0 X10E3/UL (ref 0–0.1)
IMM GRANULOCYTES NFR BLD AUTO: 0 %
LYMPHOCYTES # BLD AUTO: 1.7 X10E3/UL (ref 0.7–3.1)
LYMPHOCYTES NFR BLD AUTO: 32 %
MCH RBC QN AUTO: 31.4 PG (ref 26.6–33)
MCHC RBC AUTO-ENTMCNC: 34.9 G/DL (ref 31.5–35.7)
MCV RBC AUTO: 90 FL (ref 79–97)
MONOCYTES # BLD AUTO: 0.4 X10E3/UL (ref 0.1–0.9)
MONOCYTES NFR BLD AUTO: 8 %
NEUTROPHILS # BLD AUTO: 3.1 X10E3/UL (ref 1.4–7)
NEUTROPHILS NFR BLD AUTO: 58 %
PLATELET # BLD AUTO: 199 X10E3/UL (ref 150–450)
POTASSIUM SERPL-SCNC: 4.3 MMOL/L (ref 3.5–5.2)
PROT SERPL-MCNC: 6.6 G/DL (ref 6–8.5)
RBC # BLD AUTO: 4.49 X10E6/UL (ref 3.77–5.28)
SODIUM SERPL-SCNC: 141 MMOL/L (ref 134–144)
WBC # BLD AUTO: 5.3 X10E3/UL (ref 3.4–10.8)

## 2024-06-04 LAB — C DIFF TOX GENS STL QL NAA+PROBE: NEGATIVE

## 2024-06-10 ENCOUNTER — TELEPHONE (OUTPATIENT)
Dept: INTERNAL MEDICINE | Facility: CLINIC | Age: 59
End: 2024-06-10

## 2024-06-10 NOTE — TELEPHONE ENCOUNTER
Caller: Catie Wright    Relationship to patient: Self    Best call back number: [phone]     Patient is needing: AN UPDATE ON GASTROENTEROLOGIST REFERRAL

## 2024-06-12 ENCOUNTER — TELEPHONE (OUTPATIENT)
Dept: GASTROENTEROLOGY | Facility: CLINIC | Age: 59
End: 2024-06-12
Payer: COMMERCIAL

## 2024-06-12 NOTE — TELEPHONE ENCOUNTER
Returned the pt's call and told her that Dr Hirsch or any of our physicians are not taking any new pts at this time.  Gave her [facility] number and told her to call there for an appt.

## 2024-06-12 NOTE — TELEPHONE ENCOUNTER
Caller: Catie Wright    Relationship to patient: Self    Best call back number: [phone]    Chief complaint: NEW PATIENT REFERRAL    Type of visit: OFFICE VISIT    Additional notes:PATIENT HAD REFERRAL MARKED  [facility] BUT OUR OFFICE ADVISED ON REFERRAL ABOUT APPT AVAILABILITY. PATIENT IS WANTING TO SEE ABOUT GETTING ADDED TO A CANCELLATION LIST SO SHE MAY BE SCHEDULED WITH DR. THOMPSON. PLEASE REVIEW AND CONTACT PATIENT TO ADVISE.

## 2024-06-25 ENCOUNTER — HOSPITAL ENCOUNTER (OUTPATIENT)
Facility: HOSPITAL | Age: 59
Discharge: HOME | End: 2024-06-25
Admitting: NURSE PRACTITIONER
Payer: COMMERCIAL

## 2024-06-25 DIAGNOSIS — R19.7 DIARRHEA, UNSPECIFIED TYPE: ICD-10-CM

## 2024-06-25 DIAGNOSIS — R14.0 ABDOMINAL BLOATING: ICD-10-CM

## 2024-06-25 PROCEDURE — 74177 CT ABD & PELVIS W/CONTRAST: CPT

## 2024-06-25 PROCEDURE — 0 DIATRIZOATE MEGLUMINE & SODIUM PER 1 ML: Performed by: NURSE PRACTITIONER

## 2024-06-25 PROCEDURE — 25510000001 IOPAMIDOL 61 % SOLUTION: Performed by: NURSE PRACTITIONER

## 2024-06-25 RX ADMIN — IOPAMIDOL 100 ML: 612 INJECTION, SOLUTION INTRAVENOUS at 15:22

## 2024-06-25 RX ADMIN — DIATRIZOATE MEGLUMINE AND DIATRIZOATE SODIUM 30 ML: 600; 100 SOLUTION ORAL; RECTAL at 14:09

## 2024-07-10 ENCOUNTER — PREP FOR SURGERY (OUTPATIENT)
Dept: SURGERY | Facility: SURGERY CENTER | Age: 59
End: 2024-07-10
Payer: COMMERCIAL

## 2024-07-10 ENCOUNTER — OFFICE VISIT (OUTPATIENT)
Dept: GASTROENTEROLOGY | Facility: CLINIC | Age: 59
End: 2024-07-10
Payer: COMMERCIAL

## 2024-07-10 VITALS
BODY MASS INDEX: 27.66 KG/M2 | OXYGEN SATURATION: 94 % | HEART RATE: 77 BPM | HEIGHT: 63 IN | DIASTOLIC BLOOD PRESSURE: 80 MMHG | TEMPERATURE: 96.8 F | WEIGHT: 156.1 LBS | SYSTOLIC BLOOD PRESSURE: 120 MMHG

## 2024-07-10 DIAGNOSIS — R14.0 BLOATING: ICD-10-CM

## 2024-07-10 DIAGNOSIS — R93.3 ABNORMAL CT SCAN, SMALL BOWEL: ICD-10-CM

## 2024-07-10 DIAGNOSIS — R19.7 DIARRHEA, UNSPECIFIED TYPE: Primary | ICD-10-CM

## 2024-07-10 DIAGNOSIS — K60.2 ANAL FISSURE: ICD-10-CM

## 2024-07-10 PROCEDURE — 99214 OFFICE O/P EST MOD 30 MIN: CPT | Performed by: NURSE PRACTITIONER

## 2024-07-10 RX ORDER — SODIUM CHLORIDE, SODIUM LACTATE, POTASSIUM CHLORIDE, CALCIUM CHLORIDE 600; 310; 30; 20 MG/100ML; MG/100ML; MG/100ML; MG/100ML
30 INJECTION, SOLUTION INTRAVENOUS CONTINUOUS PRN
OUTPATIENT
Start: 2024-07-10

## 2024-07-10 RX ORDER — SODIUM CHLORIDE 0.9 % (FLUSH) 0.9 %
10 SYRINGE (ML) INJECTION AS NEEDED
OUTPATIENT
Start: 2024-07-10

## 2024-07-10 RX ORDER — SODIUM CHLORIDE 0.9 % (FLUSH) 0.9 %
3 SYRINGE (ML) INJECTION EVERY 12 HOURS SCHEDULED
OUTPATIENT
Start: 2024-07-10

## 2024-07-10 NOTE — PROGRESS NOTES
Chief Complaint   Patient presents with    Diarrhea         History of Present Illness  Patient is a 58-year-old female who presents today for evaluation, referred for Diarrhea.  She had a CT scan to evaluate this that showed subtle thickening of the terminal ileum and a benign hepatic hemangioma.  Stool studies were obtained and were negative for infection.    Patient presents today for evaluation.  Reports a longstanding history of Alternating constipation and diarrhea.  This has been present for many years.  She reports over the last few months noted a change in bowel habit with increased diarrhea.  She was having nocturnal stools.  Reports abdominal bloating, fullness, and generalized discomfort.    She has also been experiencing some rectal discomfort and reports at times has blood when wiping.  Has had a previous hemorrhoidectomy.    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  She has never been checked for celiac disease.  She has a personal history of psoriasis which she reports has been more flared up over the last several months.    She is currently having issues with pelvic organ prolapse and has been found to have a rectocele.  She has a consultation with urogynecology later this month.  She completed pelvic floor physical therapy but has had difficulty evacuating her stool completely.    Her last colonoscopy was in 2020.    Over the last 2 weeks she started following a gluten-free diet and has had significant improvement in symptoms since doing so.    Result Review :       Amylase (05/31/2024 14:36)    Comprehensive metabolic panel (05/31/2024 14:36)    CBC and differential (05/31/2024 14:36)    Fecal Leukocytes - Stool, Per Rectum (06/04/2024 10:00)    Stool Culture (Reference Lab) - Stool, Per Rectum (06/04/2024 10:00)    Clostridioides difficile Toxin, PCR - Stool, Per Rectum (06/03/2024 06:00)    CT Abdomen Pelvis With Contrast (06/25/2024  "15:22)    Office Visit with Barbara Abraham APRN (05/31/2024)    Referral for Diarrhea, unspecified type; Fecal urgency; Nocturnal diarrhea (05/31/2024)    Vital Signs:   /80   Pulse 77   Temp 96.8 °F (36 °C)   Ht 160 cm (62.99\")   Wt 70.8 kg (156 lb 1.6 oz)   SpO2 94%   BMI 27.66 kg/m²     Body mass index is 27.66 kg/m².     Physical Exam  Vitals reviewed.   Constitutional:       General: She is not in acute distress.     Appearance: She is well-developed.   HENT:      Head: Normocephalic and atraumatic.   Pulmonary:      Effort: Pulmonary effort is normal. No respiratory distress.   Abdominal:      General: Abdomen is flat. Bowel sounds are normal. There is no distension.      Palpations: Abdomen is soft.      Tenderness: There is no abdominal tenderness.   Genitourinary:     Rectum: Anal fissure present.   Skin:     General: Skin is dry.      Coloration: Skin is not pale.   Neurological:      Mental Status: She is alert and oriented to person, place, and time.   Psychiatric:         Thought Content: Thought content normal.           Assessment and Plan    Diagnoses and all orders for this visit:    1. Diarrhea, unspecified type (Primary)    2. Abnormal CT scan, small bowel    3. Bloating    4. Anal fissure         Discussion  Patient presents today for evaluation with concerns about diarrhea, abdominal bloating, and discomfort.  She had a recent CT scan with thickening in the ileum and has had genetic testing showing genetic predisposition for celiac disease.  Recommended EGD with duodenal biopsy to assess for celiac disease and recommended colonoscopy to evaluate for any evidence of ileitis or colitis, assess for any evidence of inflammatory bowel disease.    I did recommend she resume eating gluten for 3 to 4 weeks prior to endoscopic evaluation to allow for accurate assessment for celiac disease.    On exam in the office today she does have an anal fissure.  Will start diltiazem and lidocaine " cream to treat this.              Follow Up   Return for Follow up to review results after testing complete.    Patient Instructions   Schedule EGD and colonoscopy for further evaluation.     Recommend resuming eating gluten for 3 to 4 weeks prior to EGD to allow for accurate assessment for celiac disease.    For anal fissure, begin using diltiazem 2% with lidocaine 3% ointment.  Apply pea-sized amount to anal fissure 3 times daily for 6 weeks.  Written prescription provided to be taken to compounding pharmacy.

## 2024-07-10 NOTE — PATIENT INSTRUCTIONS
Schedule EGD and colonoscopy for further evaluation.     Recommend resuming eating gluten for 3 to 4 weeks prior to EGD to allow for accurate assessment for celiac disease.    For anal fissure, begin using diltiazem 2% with lidocaine 3% ointment.  Apply pea-sized amount to anal fissure 3 times daily for 6 weeks.  Written prescription provided to be taken to compounding pharmacy.

## 2024-08-06 ENCOUNTER — TELEPHONE (OUTPATIENT)
Dept: GASTROENTEROLOGY | Facility: CLINIC | Age: 59
End: 2024-08-06

## 2024-08-06 NOTE — TELEPHONE ENCOUNTER
Caller: Catie Wright    Relationship: Self    Best call back number: [phone]    What is the best time to reach you: ANYTIME BASICALLY    Who are you requesting to speak with (clinical staff, provider,  specific staff member):     What was the call regarding: PT HAS SCOPE ON 08.14.24. PT IS REQUESTING AN LOWER VOLUME PREP. PT HAS ISSUES W/DRINKING REQUIRED AMOUNTS OF PREP LIQUID. VOMITING, ETC. PLEASE CONTACT PT TO ASSIST FURTHER.     Is it okay if the provider responds through [facility]: YES

## 2024-08-09 NOTE — TELEPHONE ENCOUNTER
"Hub staff attempted to follow warm transfer process and was unsuccessful     Caller: Catie Wright    Relationship to patient: Self    Best call back number:     Patient is needing: PT WOULD LIKE TO KNOW IF THERE'S A \"LOW-VOLUME\" COLONOSCOPY PREP OFFERED// PT STATES THAT THE REGULAR PREP IS TOO MUCH LIQUID FOR HER AND PER HER PREVIOUS EXPERIENCE, IT LEADS TO HER TO VOMITING// PLEASE CALL PT BACK  "

## 2024-08-12 ENCOUNTER — TELEPHONE (OUTPATIENT)
Dept: GASTROENTEROLOGY | Facility: CLINIC | Age: 59
End: 2024-08-12
Payer: COMMERCIAL

## 2024-08-12 NOTE — TELEPHONE ENCOUNTER
"Patient calls stating she would like a \"low volume prep\" for upcoming CLS on 8/14/24.  States she's left another message for same.  If this has been taken care of, please disregard.    "

## 2024-08-13 NOTE — SIGNIFICANT NOTE
Education provided the Patient on the following:    - Nothing to Eat or Drink after MN the night before the procedure    - Avoid red/purple fluids while completing their bowel prep as ordered by physician  -Contact Gastrointerologist office for any questions about specific details regarding colon prep    -You will need to have someone drive you home after your colonoscopy and remain with you for 24 hours after the procedure  - The date of your Surgery, you may have one visitor at bedside or within 10-15 minutes of [facility]  -Please wear warm socks when you arrive for your colonoscopy  -Remove all jewelry and leave any valuables before arriving the day of your procedure (all will have to be removed before leaving preop)  -You will need to arrive at 1200 on 8-14-24 for your colonoscopy    -Feel free to contact us at: [phone] with any additional questions/concerns

## 2024-08-14 ENCOUNTER — ANESTHESIA EVENT (OUTPATIENT)
Dept: SURGERY | Facility: SURGERY CENTER | Age: 59
End: 2024-08-14
Payer: COMMERCIAL

## 2024-08-14 ENCOUNTER — ANESTHESIA (OUTPATIENT)
Dept: SURGERY | Facility: SURGERY CENTER | Age: 59
End: 2024-08-14
Payer: COMMERCIAL

## 2024-08-14 ENCOUNTER — HOSPITAL ENCOUNTER (OUTPATIENT)
Facility: SURGERY CENTER | Age: 59
Setting detail: HOSPITAL OUTPATIENT SURGERY
Discharge: HOME | End: 2024-08-14
Attending: INTERNAL MEDICINE | Admitting: INTERNAL MEDICINE
Payer: COMMERCIAL

## 2024-08-14 VITALS
DIASTOLIC BLOOD PRESSURE: 79 MMHG | HEIGHT: 63 IN | OXYGEN SATURATION: 81 % | RESPIRATION RATE: 16 BRPM | HEART RATE: 72 BPM | WEIGHT: 153.6 LBS | SYSTOLIC BLOOD PRESSURE: 113 MMHG | TEMPERATURE: 97.8 F | BODY MASS INDEX: 27.21 KG/M2

## 2024-08-14 DIAGNOSIS — R93.3 ABNORMAL CT SCAN, SMALL BOWEL: ICD-10-CM

## 2024-08-14 DIAGNOSIS — R19.7 DIARRHEA, UNSPECIFIED TYPE: ICD-10-CM

## 2024-08-14 DIAGNOSIS — R14.0 BLOATING: ICD-10-CM

## 2024-08-14 PROCEDURE — 43239 EGD BIOPSY SINGLE/MULTIPLE: CPT | Performed by: INTERNAL MEDICINE

## 2024-08-14 PROCEDURE — 45378 DIAGNOSTIC COLONOSCOPY: CPT | Performed by: INTERNAL MEDICINE

## 2024-08-14 PROCEDURE — 88305 TISSUE EXAM BY PATHOLOGIST: CPT | Performed by: INTERNAL MEDICINE

## 2024-08-14 PROCEDURE — 25810000003 LACTATED RINGERS PER 1000 ML: Performed by: NURSE PRACTITIONER

## 2024-08-14 PROCEDURE — 25010000002 PROPOFOL 10 MG/ML EMULSION: Performed by: REGISTERED NURSE

## 2024-08-14 RX ORDER — OMEPRAZOLE 40 MG/1
40 CAPSULE, DELAYED RELEASE ORAL DAILY
Qty: 30 CAPSULE | Refills: 5 | Status: SHIPPED | OUTPATIENT
Start: 2024-08-14

## 2024-08-14 RX ORDER — SODIUM CHLORIDE 0.9 % (FLUSH) 0.9 %
3 SYRINGE (ML) INJECTION EVERY 12 HOURS SCHEDULED
Status: DISCONTINUED | OUTPATIENT
Start: 2024-08-14 | End: 2024-08-14 | Stop reason: HOSPADM

## 2024-08-14 RX ORDER — LIDOCAINE HYDROCHLORIDE 20 MG/ML
INJECTION, SOLUTION INFILTRATION; PERINEURAL AS NEEDED
Status: DISCONTINUED | OUTPATIENT
Start: 2024-08-14 | End: 2024-08-14 | Stop reason: SURG

## 2024-08-14 RX ORDER — ONDANSETRON 2 MG/ML
4 INJECTION INTRAMUSCULAR; INTRAVENOUS ONCE AS NEEDED
Status: DISCONTINUED | OUTPATIENT
Start: 2024-08-14 | End: 2024-08-14 | Stop reason: HOSPADM

## 2024-08-14 RX ORDER — PROPOFOL 10 MG/ML
VIAL (ML) INTRAVENOUS AS NEEDED
Status: DISCONTINUED | OUTPATIENT
Start: 2024-08-14 | End: 2024-08-14 | Stop reason: SURG

## 2024-08-14 RX ORDER — SODIUM CHLORIDE 0.9 % (FLUSH) 0.9 %
10 SYRINGE (ML) INJECTION AS NEEDED
Status: DISCONTINUED | OUTPATIENT
Start: 2024-08-14 | End: 2024-08-14 | Stop reason: HOSPADM

## 2024-08-14 RX ORDER — SODIUM CHLORIDE, SODIUM LACTATE, POTASSIUM CHLORIDE, CALCIUM CHLORIDE 600; 310; 30; 20 MG/100ML; MG/100ML; MG/100ML; MG/100ML
30 INJECTION, SOLUTION INTRAVENOUS CONTINUOUS PRN
Status: DISCONTINUED | OUTPATIENT
Start: 2024-08-14 | End: 2024-08-14 | Stop reason: HOSPADM

## 2024-08-14 RX ADMIN — PROPOFOL 160 MCG/KG/MIN: 10 INJECTION, EMULSION INTRAVENOUS at 12:15

## 2024-08-14 RX ADMIN — PROPOFOL 150 MG: 10 INJECTION, EMULSION INTRAVENOUS at 12:15

## 2024-08-14 RX ADMIN — SODIUM CHLORIDE, POTASSIUM CHLORIDE, SODIUM LACTATE AND CALCIUM CHLORIDE 30 ML/HR: 600; 310; 30; 20 INJECTION, SOLUTION INTRAVENOUS at 11:58

## 2024-08-14 RX ADMIN — LIDOCAINE HYDROCHLORIDE 100 MG: 20 INJECTION, SOLUTION INFILTRATION; PERINEURAL at 12:15

## 2024-08-14 NOTE — ANESTHESIA POSTPROCEDURE EVALUATION
"Patient: Catie Wright    Procedure Summary       Date: 08/14/24 Room / Location: [facility]    Anesthesia Start: 1210 Anesthesia Stop: 1248    Procedures:       ESOPHAGOGASTRODUODENOSCOPY      COLONOSCOPY TO CECUM Diagnosis:       Diarrhea, unspecified type      Abnormal CT scan, small bowel      Bloating      (Diarrhea, unspecified type [R19.7])      (Abnormal CT scan, small bowel [R93.3])      (Bloating [R14.0])    Surgeons: Rachid Corey MD Provider: Jyoti Archer MD    Anesthesia Type: MAC ASA Status: 1            Anesthesia Type: MAC    Vitals  Vitals Value Taken Time   /79 08/14/24 1322   Temp     Pulse 72 08/14/24 1310   Resp 16 08/14/24 1310   SpO2 81 % 08/14/24 1319   Vitals shown include unfiled device data.        Post Anesthesia Care and Evaluation    Patient location during evaluation: PHASE II  Patient participation: complete - patient participated  Level of consciousness: awake  Pain management: adequate    Airway patency: patent  Anesthetic complications: No anesthetic complications    Cardiovascular status: acceptable  Respiratory status: acceptable  Hydration status: acceptable    Comments: /79   Pulse 72   Temp 36.6 °C (97.8 °F) (Temporal)   Resp 16   Ht 160 cm (63\")   Wt 69.7 kg (153 lb 9.6 oz)   LMP 11/10/2017   SpO2 (!) 81%   BMI 27.21 kg/m²     "

## 2024-08-14 NOTE — ANESTHESIA PREPROCEDURE EVALUATION
Anesthesia Evaluation     Patient summary reviewed and Nursing notes reviewed                Airway   Mallampati: II  No difficulty expected  Dental - normal exam     Comment: Risk of dental injury discussed with patient    Pulmonary - negative pulmonary ROS and normal exam   (-) not a smoker  Cardiovascular - negative cardio ROS and normal exam        Neuro/Psych- negative ROS  GI/Hepatic/Renal/Endo - negative ROS     Musculoskeletal     Abdominal    Substance History - negative use     OB/GYN          Other   arthritis,   history of cancer remission                Anesthesia Plan    ASA 1     MAC       Anesthetic plan, risks, benefits, and alternatives have been provided, discussed and informed consent has been obtained with: patient.    CODE STATUS:

## 2024-08-14 NOTE — H&P
No chief complaint on file.      HPI  Bloating  H/o polyps  Abnormal CT         Problem List:    Patient Active Problem List   Diagnosis    Chondromalacia    Colon polyps    Diverticulosis    Psoriasis    Rectocele    Pelvic floor dysfunction    BMI 25.0-25.9,adult    Diarrhea    Abnormal CT scan, small bowel    Bloating       Medical History:    Past Medical History:   Diagnosis Date    Cancer     Melanoma     left upper arm        Social History:    Social History     Socioeconomic History    Marital status:    Tobacco Use    Smoking status: Never     Passive exposure: Never    Smokeless tobacco: Former     Quit date: 1990   Vaping Use    Vaping status: Never Used   Substance and Sexual Activity    Alcohol use: Yes     Comment: OCCASIONALLY    Drug use: No    Sexual activity: Yes     Partners: Male     Birth control/protection: Surgical     Comment: vassectomy       Family History:   Family History   Problem Relation Age of Onset    Migraines Mother     Stroke Mother 79    Atrial fibrillation Mother     Diabetes Father     Hypertension Father     Colon polyps Sister     Diabetes Sister     Mental illness Sister     Colon cancer Brother 59        lung, colon    Skin cancer Brother     No Known Problems Brother     Crohn's disease Neg Hx     Irritable bowel syndrome Neg Hx     Ulcerative colitis Neg Hx        Surgical History:   Past Surgical History:   Procedure Laterality Date    EXCISION Left     melanoma upper arm    HEMORRHOIDECTOMY         No current facility-administered medications for this encounter.    Current Outpatient Medications:     Cholecalciferol (Vitamin D-3) 125 MCG (5000 UT) tablet, Take 1 tablet by mouth Daily., Disp: , Rfl:     estradiol (ESTRACE) 0.1 MG/GM vaginal cream, Insert 2 g into the vagina Daily., Disp: , Rfl:     Lactobacillus (PROBIOTIC ACIDOPHILUS PO), Take  by mouth., Disp: , Rfl:     Allergies:   Allergies   Allergen Reactions    Codeine GI Intolerance        The following  portions of the patient's history were reviewed by me and updated as appropriate: review of systems, allergies, current medications, past family history, past medical history, past social history, past surgical history and problem list.    There were no vitals filed for this visit.    PHYSICAL EXAM:    CONSTITUTIONAL:  today's vital signs reviewed by me  GASTROINTESTINAL: abdomen is soft nontender nondistended with normal active bowel sounds, no masses are appreciated    Assessment/ Plan  Bloating  H/o polyps  Abnormal CT    Egd and colonoscopy    Risks and benefits as well as alternatives to endoscopic evaluation were explained to the patient and they voiced understanding and wish to proceed.  These risks include but are not limited to the risk of bleeding, perforation, adverse reaction to sedation, and missed lesions.  The patient was given the opportunity to ask questions prior to the endoscopic procedure.

## 2024-08-16 LAB
CYTO UR: NORMAL
LAB AP CASE REPORT: NORMAL
LAB AP CLINICAL INFORMATION: NORMAL
LAB AP DIAGNOSIS COMMENT: NORMAL
PATH REPORT.FINAL DX SPEC: NORMAL
PATH REPORT.GROSS SPEC: NORMAL

## 2024-08-26 ENCOUNTER — TELEPHONE (OUTPATIENT)
Dept: GASTROENTEROLOGY | Facility: CLINIC | Age: 59
End: 2024-08-26

## 2024-08-26 DIAGNOSIS — K60.2 ANAL FISSURE: Primary | ICD-10-CM

## 2024-08-27 ENCOUNTER — OFFICE VISIT (OUTPATIENT)
Dept: INTERNAL MEDICINE | Facility: CLINIC | Age: 59
End: 2024-08-27
Payer: COMMERCIAL

## 2024-08-27 VITALS
HEART RATE: 76 BPM | BODY MASS INDEX: 27.29 KG/M2 | HEIGHT: 63 IN | WEIGHT: 154 LBS | SYSTOLIC BLOOD PRESSURE: 120 MMHG | OXYGEN SATURATION: 98 % | DIASTOLIC BLOOD PRESSURE: 72 MMHG

## 2024-08-27 DIAGNOSIS — R14.0 ABDOMINAL BLOATING: Primary | ICD-10-CM

## 2024-08-27 DIAGNOSIS — R10.11 RUQ PAIN: ICD-10-CM

## 2024-08-27 PROCEDURE — 99213 OFFICE O/P EST LOW 20 MIN: CPT | Performed by: NURSE PRACTITIONER

## 2024-08-27 NOTE — PROGRESS NOTES
"Chief Complaint  Follow-up (Follow up after having Endoscopy and Colonoscopy)    Subjective        Catie Wright presents to [facility]  History of Present Illness  She is here for continued diarrhea, bloating, stomach pain with episodes of constipation.     She has an EGD and colonoscopy with Dr. Rachid Corey on 08/14/2024.     EGD: showed a nodule in the oropharnyx, LA Grade A esophagitis w/o bleeding and a single 4 mm sessile polyp in the duodenal bulb with erythematous duodenopathy. Still taking omeprazole 40 mg PO QD.     Colonoscopy: anal fissure, diverticulosis of the sigmoid colon. She is taking psyllium husk fiber.     She has cut back on gluten. She has a brother who has had his gallbladder removed. She feels that when she eats dairy her symptoms seem to worsen.    She has a follow up appointment with gastroenterology on 10/22/2024.       Objective   Vital Signs:  /72   Pulse 76   Ht 160 cm (63\")   Wt 69.9 kg (154 lb)   SpO2 98%   BMI 27.28 kg/m²   Estimated body mass index is 27.28 kg/m² as calculated from the following:    Height as of this encounter: 160 cm (63\").    Weight as of this encounter: 69.9 kg (154 lb).               Physical Exam  Vitals reviewed.   Constitutional:       General: She is not in acute distress.     Appearance: Normal appearance. She is not ill-appearing, toxic-appearing or diaphoretic.   Cardiovascular:      Rate and Rhythm: Normal rate and regular rhythm.      Pulses: Normal pulses.      Heart sounds: Normal heart sounds.   Pulmonary:      Effort: Pulmonary effort is normal.      Breath sounds: Normal breath sounds.   Abdominal:      Tenderness: There is abdominal tenderness in the right upper quadrant and epigastric area.   Neurological:      General: No focal deficit present.      Mental Status: She is alert and oriented to person, place, and time. Mental status is at baseline.   Psychiatric:         Mood and Affect: Mood normal.    "      Behavior: Behavior normal.         Thought Content: Thought content normal.         Judgment: Judgment normal.        Result Review :                   Assessment and Plan   Patient is a very pleasant 58-year-old female with personal history colon polyps, diverticulosis, history of rectocele, pelvic floor dysfunction, chondromalacia, psoriasis here for follow up RUQ and epigastric pain, bloating, mixed bowel habit post colonoscopy and EGD.           Diagnoses and all orders for this visit:    1. Abdominal bloating (Primary)  -     Cancel: Sucrose Breath Test  -     Cancel: Lactose Tolerance Test    2. RUQ pain  -     NM HIDA SCAN WITHOUT PHARMACOLOGICAL INTERVENTION; Future    Keep scheduled follow-up appointment with GI for 10/22/2024.  Consider testing for SIBO, lactose intolerance, and sucrase-isomaltase insufficiency.          Follow Up   Return if symptoms worsen or fail to improve.  Patient was given instructions and counseling regarding her condition or for health maintenance advice. Please see specific information pulled into the AVS if appropriate.         Answers submitted by the patient for this visit:  Other (Submitted on 8/27/2024)  Please describe your symptoms.: Follow up re: colonoscopy/endoscopy  Have you had these symptoms before?: Yes  How long have you been having these symptoms?: Greater than 2 weeks  Please list any medications you are currently taking for this condition.: Prilosec  Primary Reason for Visit (Submitted on 8/27/2024)  What is the primary reason for your visit?: Other

## 2024-09-09 ENCOUNTER — OFFICE VISIT (OUTPATIENT)
Dept: SURGERY | Facility: CLINIC | Age: 59
End: 2024-09-09
Payer: COMMERCIAL

## 2024-09-09 VITALS
HEART RATE: 60 BPM | SYSTOLIC BLOOD PRESSURE: 116 MMHG | OXYGEN SATURATION: 96 % | DIASTOLIC BLOOD PRESSURE: 70 MMHG | WEIGHT: 158.1 LBS | HEIGHT: 63 IN | BODY MASS INDEX: 28.01 KG/M2

## 2024-09-09 DIAGNOSIS — K60.2 FISSURE, ANAL: Primary | ICD-10-CM

## 2024-09-09 NOTE — PROGRESS NOTES
Catie Wright is a 58 y.o. female who is seen as a consult at the request of Dr. Corey for Rectal Bleeding.      HPI:  History of Present Illness  The patient is a female presenting for follow-up care related to a fissure and pelvic floor issues.    She reports experiencing pain due to the fissure, particularly when sitting. The intensity of the pain varies from day to day. This issue has been ongoing for about a year, but she only became aware of it last year. She does not typically observe any blood when she wipes. The pain sometimes radiates outwards. She has been applying diltiazem cream 2 to 3 times a day for at least a month, which has resulted in slight improvement. She experienced a burning sensation when she first started using the diltiazem cream.    She saw Dr. Doran, a colorectal specialist at [facility], a couple of years ago for incontinence and was found to have a small rectocele on exam but good tone. She was referred to urogynecology, where she has seen Dr. Estrella. She was noted to have an anterior uterovaginal prolapse and recommended pelvic floor physical therapy. She has undergone pelvic floor physical therapy as recommended by Dr. Estrella, which provided some relief. She is currently under the care of a urogynecologist, Dr. Mckay, and has a follow-up appointment scheduled in a couple of weeks.    She continues to experience incontinence, primarily with loose stools, but occasionally even with normal stools. She often feels an urgent need to use the bathroom. She reports no urinary issues.    She has been dealing with digestive issues for the past few years, suspecting a gluten sensitivity. After eliminating gluten from her diet and starting psyllium fiber, she noticed an improvement in her bowel movements and a reduction in pain. She takes about a tablespoon of psyllium fiber daily.    She underwent a hemorrhoidectomy after the birth of her first child due to a hemorrhoid that developed during  labor. The procedure was performed in the office.       Past Medical History:   Diagnosis Date    Cancer     Colon polyp 12/2015    Small polyp 8/14/2024    Diverticulitis of colon 12/2015    Diverticulosis noted on first colonoscopy    Fissure, anal 7/10/2024    Melanoma     left upper arm       Past Surgical History:   Procedure Laterality Date    COLONOSCOPY N/A 8/14/2024    Procedure: COLONOSCOPY TO CECUM;  Surgeon: Rachid Corey MD;  Location: [facility];  Service: Gastroenterology;  Laterality: N/A;  ANAL FISSURE, DIVERTICULOSIS    ENDOSCOPY N/A 8/14/2024    Procedure: ESOPHAGOGASTRODUODENOSCOPY;  Surgeon: Rachid Corey MD;  Location: [facility];  Service: Gastroenterology;  Laterality: N/A;  DUODENITIS, ESOPHAGITIS, POLYP    EXCISION Left     melanoma upper arm    HEMORRHOIDECTOMY         Social History:   reports that she has never smoked. She has never been exposed to tobacco smoke. She quit smokeless tobacco use about 34 years ago. She reports current alcohol use of about 1.0 - 2.0 standard drink of alcohol per week. She reports that she does not use drugs.      Marriage status:     Family History   Problem Relation Age of Onset    Migraines Mother     Stroke Mother 79    Atrial fibrillation Mother     Hyperlipidemia Mother     Diabetes Father     Hypertension Father     Alcohol abuse Father     COPD Father     Hearing loss Father     Heart disease Father     Hyperlipidemia Father     Colon polyps Sister     Diabetes Sister     Mental illness Sister     Depression Sister     Colon cancer Brother 59        lung, colon    Cancer Brother         Small Cell Lung, Colon    Skin cancer Brother     No Known Problems Brother     Depression Maternal Grandmother     Arthritis Paternal Grandmother     Depression Maternal Aunt     Crohn's disease Neg Hx     Irritable bowel syndrome Neg Hx     Ulcerative colitis Neg Hx          Current Outpatient Medications:     Cholecalciferol (Vitamin D-3) 125  MCG (5000 UT) tablet, Take 1 tablet by mouth Daily., Disp: , Rfl:     dilTIAZem HCl (diltiazem 2% and lidocaine 5%), Apply 1 Application topically to the appropriate area as directed As Needed., Disp: , Rfl:     estradiol (ESTRACE) 0.1 MG/GM vaginal cream, Insert 2 g into the vagina Daily., Disp: , Rfl:     Lactobacillus (PROBIOTIC ACIDOPHILUS PO), Take  by mouth., Disp: , Rfl:     MAGNESIUM CITRATE PO, Take 320 mg by mouth Daily., Disp: , Rfl:     omeprazole (priLOSEC) 40 MG capsule, Take 1 capsule by mouth Daily., Disp: 30 capsule, Rfl: 5    Allergy  Codeine      Vitals:    09/09/24 1050   BP: 116/70   Pulse: 60   SpO2: 96%     Body mass index is 28.01 kg/m².      Physical Exam    Physical Exam  Constitutional:       General: She is not in acute distress.     Appearance: She is well-developed.   HENT:      Head: Normocephalic and atraumatic.   Abdominal:      General: There is no distension.      Palpations: Abdomen is soft.   Genitourinary:     Comments: During the perianal exam, an anterior hemorrhoid was observed that will prolapse the right anterior one posteriorly on bearing down. A healing fissure was also noted.  Musculoskeletal:         General: Normal range of motion.   Neurological:      Mental Status: She is alert.   Psychiatric:         Thought Content: Thought content normal.         Review of Medical Record:  I reviewed medical records as detailed in HPI.     Assessment & Plan  1. Anal fissure.  The patient's symptoms and physical examination findings are consistent with an anal fissure. Continuation of fiber intake is recommended for symptom management. The dosage of diltiazem cream has been increased to four times daily. She is advised to maintain her fiber intake. The use of stool softeners is not deemed necessary at this time. Should her condition improve significantly, she may contact us to cancel the follow-up appointment.    2. Hemorrhoid.  A small hemorrhoid was noted on the right side  towards the vagina. Occasional bleeding may occur from this hemorrhoid. No immediate intervention is required at this time.    3. Anterior uterovaginal prolapse.  The patient has been noted to have an anterior uterovaginal prolapse. She is currently seeing Dr. Mckay, a urogynecologist, for this condition and is using a pessary for support. She will follow up with Dr. Mckay in a couple of weeks.    4. Incontinence.  The patient experiences incontinence, particularly with loose stools and occasionally with normal stools. She is advised to continue her fiber intake to manage stool consistency. The possibility of a sacral nerve stimulator was discussed with Dr. Mckay, and further evaluation will be done during her follow-up with the urogynecologist.    Follow-up  The patient will follow up in 6 weeks.     1. Fissure, anal          Patient or patient representative verbalized consent for the use of Ambient Listening during the visit with  Chris Cruz MD for chart documentation. 9/13/2024  10:49 EDT

## 2024-09-09 NOTE — PROGRESS NOTES
Catie Wright is a 58 y.o. female who is seen as a consult at the request of No ref. provider found for Rectal Bleeding.      HPI:  History of Present Illness         Past Medical History:   Diagnosis Date    Cancer     Colon polyp 12/2015    Small polyp 8/14/2024    Diverticulitis of colon 12/2015    Diverticulosis noted on first colonoscopy    Fissure, anal 7/10/2024    Melanoma     left upper arm       Past Surgical History:   Procedure Laterality Date    COLONOSCOPY N/A 8/14/2024    Procedure: COLONOSCOPY TO CECUM;  Surgeon: Rachid Corey MD;  [place] [facility] [place] [facility];  Service: Gastroenterology;  Laterality: N/A;  ANAL FISSURE, DIVERTICULOSIS    ENDOSCOPY N/A 8/14/2024    Procedure: ESOPHAGOGASTRODUODENOSCOPY;  Surgeon: Rachid Corey MD;  [place] [facility] [place] [facility];  Service: Gastroenterology;  Laterality: N/A;  DUODENITIS, ESOPHAGITIS, POLYP    EXCISION Left     melanoma upper arm    HEMORRHOIDECTOMY         Social History:   reports that she has never smoked. She has never been exposed to tobacco smoke. She quit smokeless tobacco use about 34 years ago. She reports current alcohol use of about 1.0 - 2.0 standard drink of alcohol per week. She reports that she does not use drugs.      Marriage status:     Family History   Problem Relation Age of Onset    Migraines Mother     Stroke Mother 79    Atrial fibrillation Mother     Hyperlipidemia Mother     Diabetes Father     Hypertension Father     Alcohol abuse Father     COPD Father     Hearing loss Father     Heart disease Father     Hyperlipidemia Father     Colon polyps Sister     Diabetes Sister     Mental illness Sister     Depression Sister     Colon cancer Brother 59        lung, colon    Cancer Brother         Small Cell Lung, Colon    Skin cancer Brother     No Known Problems Brother     Depression Maternal Grandmother     Arthritis Paternal Grandmother     Depression Maternal Aunt     Crohn's disease Neg Hx     Irritable bowel syndrome Neg  Hx     Ulcerative colitis Neg Hx          Current Outpatient Medications:     Cholecalciferol (Vitamin D-3) 125 MCG (5000 UT) tablet, Take 1 tablet by mouth Daily., Disp: , Rfl:     estradiol (ESTRACE) 0.1 MG/GM vaginal cream, Insert 2 g into the vagina Daily., Disp: , Rfl:     Lactobacillus (PROBIOTIC ACIDOPHILUS PO), Take  by mouth., Disp: , Rfl:     MAGNESIUM CITRATE PO, Take 320 mg by mouth Daily., Disp: , Rfl:     omeprazole (priLOSEC) 40 MG capsule, Take 1 capsule by mouth Daily., Disp: 30 capsule, Rfl: 5    Allergy  Codeine      There were no vitals filed for this visit.  There is no height or weight on file to calculate BMI.      Physical Exam    Physical Exam    Review of Medical Record:  I reviewed medical records as detailed in HPI.     Assessment & Plan       No diagnosis found.      {ABDIRASHID [employer] Provider Statement:15024}

## 2024-09-17 ENCOUNTER — HOSPITAL ENCOUNTER (OUTPATIENT)
Dept: NUCLEAR MEDICINE | Facility: HOSPITAL | Age: 59
Discharge: HOME | End: 2024-09-17
Payer: COMMERCIAL

## 2024-09-17 DIAGNOSIS — R10.11 RUQ PAIN: ICD-10-CM

## 2024-09-17 PROCEDURE — A9537 TC99M MEBROFENIN: HCPCS | Performed by: NURSE PRACTITIONER

## 2024-09-17 PROCEDURE — 0 TECHNETIUM TC 99M MEBROFENIN KIT: Performed by: NURSE PRACTITIONER

## 2024-09-17 PROCEDURE — 78226 HEPATOBILIARY SYSTEM IMAGING: CPT

## 2024-09-17 RX ORDER — KIT FOR THE PREPARATION OF TECHNETIUM TC 99M MEBROFENIN 45 MG/10ML
1 INJECTION, POWDER, LYOPHILIZED, FOR SOLUTION INTRAVENOUS
Status: COMPLETED | OUTPATIENT
Start: 2024-09-17 | End: 2024-09-17

## 2024-09-17 RX ADMIN — MEBROFENIN 1 DOSE: 45 INJECTION, POWDER, LYOPHILIZED, FOR SOLUTION INTRAVENOUS at 10:28

## 2024-09-19 ENCOUNTER — TELEPHONE (OUTPATIENT)
Dept: GASTROENTEROLOGY | Facility: CLINIC | Age: 59
End: 2024-09-19
Payer: COMMERCIAL

## 2024-09-19 NOTE — TELEPHONE ENCOUNTER
Hub staff attempted to follow warm transfer process and was unsuccessful     Caller: SADI MELENDEZ    Relationship to patient: SELF    Best call back number: [phone]     Patient is needing: MS. MELENDEZ HAD AN EGD DONE ON 8/14/24 AND WAS ADVISED TO SEE HER ENT. DR. GEORGIANA YANG, WITH [facility], HAS ASKED TO HAVE A COPY OF IMAGE OF THE NODULES FOUND IN HER THROAT. HE WOULD ALSO LIKE A COPY OF THE REPORT. MS. MELENDEZ HAS AN APPT WITH DR. YANG ON OCT 2, 2024. THE FAX NUMBER [phone] ATTN: MIKE. PLEASE REVIEW AND ADVISE.    OK TO CALL BACK ANYTIME. OK TO LEAVE .

## 2024-09-26 ENCOUNTER — TELEPHONE (OUTPATIENT)
Dept: GASTROENTEROLOGY | Facility: CLINIC | Age: 59
End: 2024-09-26
Payer: COMMERCIAL

## 2024-09-26 NOTE — TELEPHONE ENCOUNTER
Caller: WrightCatie    Relationship: Self    Best call back number: [phone]    What is the best time to reach you: ANYTIME    Who are you requesting to speak with (clinical staff, provider,  specific staff member): CLINICAL STAFF       What was the call regarding: PT WAS PRESCRIBED OMEPRAZOLE AND IS WANTING TO STOP TAKING IT UNTIL SHE SEES [facility] BECAUSE OF LONG TERM SIDE EFFECTS. PT IS HAVING BLOATING , GAS , AND ABDOMINAL PAIN. PTS PCP DID A HIDA SCAN ON PT AND THE RESULTS ARE IN THE CHART. PT WAS REFERRED TO A GENERAL SURGERY THAT RECOMMEND PATIENT TO GET A ULTRASOUND TO CHECK OUT GALLBLADDER. PT IS WANTING TO UPDATE [facility] ON THIS SITUATION. PLEASE CALL AND ADVISE

## 2024-09-30 NOTE — TELEPHONE ENCOUNTER
Update received.  No problem to stop taking the omeprazole.  If she needs something for reflux between now an appointment, recommend Pepcid OTC.

## 2024-10-22 ENCOUNTER — OFFICE VISIT (OUTPATIENT)
Dept: GASTROENTEROLOGY | Facility: CLINIC | Age: 59
End: 2024-10-22
Payer: COMMERCIAL

## 2024-10-22 VITALS
DIASTOLIC BLOOD PRESSURE: 72 MMHG | BODY MASS INDEX: 27.45 KG/M2 | WEIGHT: 154.9 LBS | SYSTOLIC BLOOD PRESSURE: 114 MMHG | HEART RATE: 78 BPM | TEMPERATURE: 97.7 F | HEIGHT: 63 IN | OXYGEN SATURATION: 98 %

## 2024-10-22 DIAGNOSIS — K60.2 ANAL FISSURE: ICD-10-CM

## 2024-10-22 DIAGNOSIS — R14.0 BLOATING: Primary | ICD-10-CM

## 2024-10-22 DIAGNOSIS — R19.8 ALTERNATING CONSTIPATION AND DIARRHEA: ICD-10-CM

## 2024-10-22 DIAGNOSIS — K21.00 GASTROESOPHAGEAL REFLUX DISEASE WITH ESOPHAGITIS WITHOUT HEMORRHAGE: ICD-10-CM

## 2024-10-22 DIAGNOSIS — K57.90 DIVERTICULOSIS: ICD-10-CM

## 2024-10-22 DIAGNOSIS — R15.9 INCONTINENCE OF FECES, UNSPECIFIED FECAL INCONTINENCE TYPE: ICD-10-CM

## 2024-10-22 NOTE — PATIENT INSTRUCTIONS
Schedule hydrogen breath test to assess for small intestinal bacterial overgrowth.     Check pancreatic fecal elastase to check for exocrine pancreatic insufficiency.    Schedule anorectal manometry and defecating proctogram.    Recommend starting a daily fiber supplement such as Citrucel, Metamucil, or FiberCon available over-the-counter.     For dietitian or food sensitivity testing, recommend:  Nannette Rosado RD  Address: [facility] [place] [address]  Phone: [phone]  Website: [email or web address]

## 2024-10-22 NOTE — PROGRESS NOTES
Chief Complaint   Patient presents with    GI Problem         History of Present Illness  Patient is a 58-year-old female who presents today for Follow-up.  She was seen in the office July 2024 with concerns about diarrhea.  She had a CT scan showing thickening in the terminal ileum and prior genetic testing suggestive of possible predisposition for celiac disease.  EGD and colonoscopy were performed August 2024.  EGD with a nodule in the oropharynx, LA grade a esophagitis, duodenal polyp, erythematous duodenopathy.  Biopsies were benign, no evidence of celiac disease.  Did show possible developing Barber's esophagus.  Colonoscopy with anal fissure, diverticulosis.  Terminal ileum appeared normal.    Patient presents today for follow-up.  She has had persistent symptoms length since last office visit with no real change.  She took Prilosec for period of time following the EGD but felt that this worsened bloating and constipation.    She took psyllium which helped for a few weeks but then was no longer helping.    She reports she has had persistent abdominal bloating that is present on a regular basis.  Her bowel movements alternate between diarrhea and constipation.  At times she has diarrhea and urgency, but other times stool is more firm and she has difficulty evacuating the stool completely.  She has had episodes of fecal incontinence and leakage and regularly does not feel that she empties completely.    She had a follow-up with Dr. Cruz regarding anal fissure, has been using diltiazem/lidocaine ointment which has been helping.    Had follow-up with urogynecology and they may try a different pessary.    She also had his HIDA scan completed that was abnormal with ejection fraction of 23%.  Follow-up gallbladder ultrasound showed normal-appearing gallbladder.  She has had no abdominal pain, nausea, or vomiting.  Denies any heartburn or reflux.    She avoids gluten and dairy and does not drink alcohol.  She has  "questions about food sensitivity testing.     Result Review :       Tissue Pathology Exam (08/14/2024 12:20)    Colonoscopy (08/14/2024 11:50)    Upper GI Endoscopy (08/14/2024 11:51)    Office Visit with Saba Pineda APRN (07/10/2024)    Vital Signs:   /72   Pulse 78   Temp 97.7 °F (36.5 °C)   Ht 160 cm (63\")   Wt 70.3 kg (154 lb 14.4 oz)   SpO2 98%   BMI 27.44 kg/m²     Body mass index is 27.44 kg/m².     Physical Exam  Vitals reviewed.   Constitutional:       General: She is not in acute distress.     Appearance: She is well-developed.   HENT:      Head: Normocephalic and atraumatic.   Pulmonary:      Effort: Pulmonary effort is normal. No respiratory distress.   Abdominal:      General: Abdomen is flat. Bowel sounds are normal. There is no distension.      Palpations: Abdomen is soft.      Tenderness: There is no abdominal tenderness.   Skin:     General: Skin is dry.      Coloration: Skin is not pale.   Neurological:      Mental Status: She is alert and oriented to person, place, and time.   Psychiatric:         Thought Content: Thought content normal.           Assessment and Plan    Diagnoses and all orders for this visit:    1. Bloating (Primary)  -     Pancreatic Elastase, Fecal - Stool, Per Rectum  -     Breath Hydrogen Test; Future    2. Alternating constipation and diarrhea    3. Incontinence of feces, unspecified fecal incontinence type  -     Anorectal Manometry  -     XR Abdomen 1 View; Future    4. Gastroesophageal reflux disease with esophagitis without hemorrhage    5. Anal fissure    6. Diverticulosis         Discussion  Patient presents today for follow-up with concerns about persistent abdominal bloating and bowel concerns.  Recommended hydrogen breath test to evaluate for small intestinal bacterial overgrowth.  We will also have her submit stool for pancreatic fecal elastase testing to assess for exocrine pancreatic insufficiency.  Discussed symptoms may be due to irritable " bowel syndrome with diarrhea and suspect pelvic floor dysfunction is contributing to symptoms as well.  Will schedule anorectal manometry and defecating proctogram for further evaluation.  Depending upon results, could consider referral back for pelvic floor physical therapy or sacral nerve stimulation.    While testing is being completed, recommended starting a daily fiber supplement to help with bowel regularity.  As she is not experiencing any upper GI symptoms, do not feel PPI needed.  Will plan on EGD to reevaluate for Barber's in 3 years for surveillance and we reviewed dietary modifications to help with reflux.    Regarding abnormal HIDA scan, do not feel symptoms are secondary to cholecystitis or biliary dysfunction and would recommend holding off on HIDA scan at this time.  If she develops any upper abdominal pain, could consider in the future.          Follow Up   Return for Follow up to review results after testing complete.    Patient Instructions   Schedule hydrogen breath test to assess for small intestinal bacterial overgrowth.     Check pancreatic fecal elastase to check for exocrine pancreatic insufficiency.    Schedule anorectal manometry and defecating proctogram.    Recommend starting a daily fiber supplement such as Citrucel, Metamucil, or FiberCon available over-the-counter.     For dietitian or food sensitivity testing, recommend:  Nannette Rosado RD  Address: [address] [place] [address]  Phone: [phone]  Website: [email or web address]

## 2024-10-23 ENCOUNTER — LAB (OUTPATIENT)
Facility: HOSPITAL | Age: 59
End: 2024-10-23
Payer: COMMERCIAL

## 2024-10-23 PROCEDURE — 82653 EL-1 FECAL QUANTITATIVE: CPT | Performed by: NURSE PRACTITIONER

## 2024-10-26 LAB — ELASTASE PANC STL-MCNT: >800 UG ELAST./G

## 2024-11-07 ENCOUNTER — TELEPHONE (OUTPATIENT)
Dept: GASTROENTEROLOGY | Facility: CLINIC | Age: 59
End: 2024-11-07
Payer: COMMERCIAL

## 2024-11-07 NOTE — TELEPHONE ENCOUNTER
RN returned call to patient to answer her questions regarding the Hydrogen Breath Test. Pt received answers for questions and verbalized understanding. Pt reports she was referred out to [facility] for manometry and defecating proctogram. Pt reports manometry was preformed 11/4/24. Pt reports she has not heard from [facility] regarding the proctogram.Please advise. EL

## 2024-11-13 ENCOUNTER — TELEPHONE (OUTPATIENT)
Dept: GASTROENTEROLOGY | Facility: CLINIC | Age: 59
End: 2024-11-13
Payer: COMMERCIAL

## 2024-11-13 DIAGNOSIS — R14.0 BLOATING: ICD-10-CM

## 2024-11-20 ENCOUNTER — HOSPITAL ENCOUNTER (OUTPATIENT)
Facility: HOSPITAL | Age: 59
Discharge: HOME | End: 2024-11-20
Admitting: NURSE PRACTITIONER
Payer: COMMERCIAL

## 2024-11-20 DIAGNOSIS — R15.9 INCONTINENCE OF FECES, UNSPECIFIED FECAL INCONTINENCE TYPE: ICD-10-CM

## 2024-11-20 PROCEDURE — 74018 RADEX ABDOMEN 1 VIEW: CPT

## 2024-11-26 ENCOUNTER — TELEPHONE (OUTPATIENT)
Dept: GASTROENTEROLOGY | Facility: CLINIC | Age: 59
End: 2024-11-26
Payer: COMMERCIAL

## 2024-11-26 DIAGNOSIS — M62.89 PELVIC FLOOR DYSFUNCTION: ICD-10-CM

## 2024-11-26 DIAGNOSIS — M62.89 PELVIC FLOOR DYSFUNCTION: Primary | ICD-10-CM

## 2024-11-26 NOTE — TELEPHONE ENCOUNTER
Hub staff attempted to follow warm transfer process and was unsuccessful     Caller: Catie Wright    Relationship to patient: Self    Best call back number: [phone]      Patient is needing: PT OF [facility].  RETURNING PHONE CALL FROM [facility] ABOUT A REFERRAL.

## 2024-11-27 NOTE — TELEPHONE ENCOUNTER
I called and spoke to the patient and she is aware that the referral has been refaxed to [facility]. Pt understood.

## 2025-01-22 ENCOUNTER — TELEPHONE (OUTPATIENT)
Dept: GASTROENTEROLOGY | Facility: CLINIC | Age: 60
End: 2025-01-22
Payer: COMMERCIAL

## 2025-01-22 DIAGNOSIS — N81.6 RECTOCELE: Primary | ICD-10-CM

## 2025-01-22 DIAGNOSIS — K56.1: ICD-10-CM

## 2025-01-22 NOTE — TELEPHONE ENCOUNTER
Patient left a Voice Message requesting a call back with last test results:     CR DEFECOGRAPHY (01/16/2025 10:31)    She needs to inform PT (Cristina) about it. Report from [facility] has been scanned into chart attached to original order:    IR Perc Transh Protogram With Pressure (11/26/2024 12:17)      IMPRESSION:     1. No significant posterior compartment pelvic floor descent.  Mild intra-rectal intussusception during defection.     2. Small anterior rectocele measuring up to 1.6 cm.     Pending comment and recommendations from ordering provider.

## 2025-01-23 NOTE — TELEPHONE ENCOUNTER
I called pt and explained results and recommendations.     Pt stated that she had shown results to her PT Mesha Wright already ([facility] at [address] [facility] [address], [place] [address],Tel [phone], Fax [phone]), who told her that she needed the recommendations from provider. I faxed it.     She requested the referral to Colorectal Surgery to be with the best provider able to treat her condition(s).

## 2025-01-23 NOTE — TELEPHONE ENCOUNTER
Pt stated that Dr Cruz saw her before and it was a short and simple interaction. She had no objection on seeing that  again, but she wanted to be seen by the best physician, treating small rectocele and mild rectal intussusception.

## 2025-01-23 NOTE — TELEPHONE ENCOUNTER
Defecating proctogram shows a small rectocele and mild rectal intussusception, which is the bowel folding like a telescope on itself during a bowel movement.    Please forward copy to her PT.    I would also recommend follow-up with colorectal surgery to see if they feel rectocele needs to be repaired or intussusception needs surgery.

## 2025-01-23 NOTE — TELEPHONE ENCOUNTER
She has seen Dr. Cruz before, we can arrange for follow up with her if she is OK with that or refer elsewhere.

## 2025-02-03 ENCOUNTER — OFFICE VISIT (OUTPATIENT)
Dept: SURGERY | Facility: CLINIC | Age: 60
End: 2025-02-03
Payer: COMMERCIAL

## 2025-02-03 VITALS
WEIGHT: 158 LBS | BODY MASS INDEX: 28 KG/M2 | HEART RATE: 57 BPM | DIASTOLIC BLOOD PRESSURE: 78 MMHG | SYSTOLIC BLOOD PRESSURE: 122 MMHG | HEIGHT: 63 IN

## 2025-02-03 DIAGNOSIS — K62.3 INTERNAL COMPLETE RECTAL PROLAPSE WITH INTUSSUSCEPTION OF RECTOSIGMOID: Primary | ICD-10-CM

## 2025-02-03 DIAGNOSIS — K56.1 INTERNAL COMPLETE RECTAL PROLAPSE WITH INTUSSUSCEPTION OF RECTOSIGMOID: Primary | ICD-10-CM

## 2025-02-03 PROCEDURE — 99214 OFFICE O/P EST MOD 30 MIN: CPT | Performed by: COLON & RECTAL SURGERY

## 2025-02-03 NOTE — PROGRESS NOTES
"Catie Wright is a 59 y.o. female in for follow up of Internal complete rectal prolapse with intussusception of rectosigmoid    History of Present Illness  The patient is a 59-year-old female who presents for evaluation of pelvic floor dysfunction.    She has been experiencing difficulty with bowel movements, initially attributing this to constipation. However, she now suspects that her rectocele may be contributing to this issue. She has been under the care of Kacey Pineda in gastroenterology and has increased her intake of fiber and water, resulting in improved stool consistency. Despite these efforts, she experienced a setback after the holidays, with harder stools and associated pain. She is uncertain if these symptoms are due to a fissure or another condition. During physical therapy, a small bump was detected, which could be a thrombosed hemorrhoid or a recurrent fissure. She reports discomfort while sitting and expresses concern about potential worsening of her condition. She does not report any bleeding. She is currently seeing Dr. Mckay and is reluctant to undergo surgery at this time. She has been attending physical therapy since January 2025, which she reports as beneficial. She had seen Dr. Moy Doran on 07/15/2022 for fecal urgency and incontinence. A rectocele with good tone was noted, and she was referred to urogynecology, where she was found to have an anterior uterovaginal prolapse. She has been followed up with gastroenterology and had a defecography, which showed mild intrarectal intussusception and an anterior rectocele. Manometry showed decreased resting and squeeze pressure for relaxation, and she was encouraged to do physical therapy.    She also reports some prolapse issues and uses vaginal estrogen for thinning.    MEDICATIONS  Current: vaginal estrogen       /78 (BP Location: Left arm, Patient Position: Sitting, Cuff Size: Adult)   Pulse 57   Ht 160 cm (63\")   Wt 71.7 kg (158 lb)  "  LMP 11/10/2017   Breastfeeding No   BMI 27.99 kg/m²   Body mass index is 27.99 kg/m².      PE:   Physical Exam    Physical Exam  Constitutional:       General: She is not in acute distress.     Appearance: She is well-developed.   HENT:      Head: Normocephalic and atraumatic.   Abdominal:      General: There is no distension.      Palpations: Abdomen is soft.   Genitourinary:     Comments: On perianal exam, externally there is no evidence of prolapse. On digital rectal exam, patient on the left lateral side, there is a thickened area that is tender. On anoscopy exam, there is scar tissue but no erythema, no evidence of fistula or fissure, no erythema.  Musculoskeletal:         General: Normal range of motion.   Neurological:      Mental Status: She is alert.   Psychiatric:         Thought Content: Thought content normal.             Assessment & Plan  1. Pelvic floor dysfunction.   2 Internal complete rectal prolapse with intussusception of rectosigmoid      The patient has been experiencing difficulty with bowel movements, initially attributing this to constipation. However, she now suspects that her rectocele may be contributing to this issue. She has been under the care of Kacey Pineda in gastroenterology and has increased her intake of fiber and water, resulting in improved stool consistency. Despite these efforts, she experienced a setback after the holidays, with harder stools and associated pain. She is uncertain if these symptoms are due to a fissure or another condition. During physical therapy, a small bump was detected, which could be a thrombosed hemorrhoid or a recurrent fissure. She reports discomfort while sitting and expresses concern about potential worsening of her condition. She does not report any bleeding. She is currently seeing Dr. Mckay and is reluctant to undergo surgery at this time. A comprehensive discussion was held regarding the potential benefits of physical therapy and surgical  intervention for her condition. She expressed a preference to avoid surgery at this time. She was advised to continue with physical therapy and to monitor her symptoms closely. A prescription for 5% lidocaine ointment was provided to alleviate her discomfort. If her symptoms worsen, she will contact the office immediately.    3. Rectocele.  The patient has been experiencing difficulty with bowel movements, initially attributing this to constipation. However, she now suspects that her rectocele may be contributing to this issue. She has been under the care of Kacey Pineda in gastroenterology and has increased her intake of fiber and water, resulting in improved stool consistency. Despite these efforts, she experienced a setback after the holidays, with harder stools and associated pain. She is uncertain if these symptoms are due to a fissure or another condition. During physical therapy, a small bump was detected, which could be a thrombosed hemorrhoid or a recurrent fissure. She reports discomfort while sitting and expresses concern about potential worsening of her condition. She does not report any bleeding. She is currently seeing Dr. Mckay and is reluctant to undergo surgery at this time. A comprehensive discussion was held regarding the potential benefits of physical therapy and surgical intervention for her condition. She expressed a preference to avoid surgery at this time. She was advised to continue with physical therapy and to monitor her symptoms closely. A prescription for 5% lidocaine ointment was provided to alleviate her discomfort. If her symptoms worsen, she will contact the office immediately.       I spent 31 minutes caring for Catie Wright on this date of service. This time includes time spent by me in the following activities:preparing for the visit, reviewing tests, obtaining and/or reviewing a separately obtained history, performing a medically appropriate examination and/or evaluation ,  counseling and educating the patient/family/caregiver, ordering medications, tests, or procedures, referring and communicating with other health care professionals  and independently interpreting results and communicating that information with the patient/family/caregiver           Patient or patient representative verbalized consent for the use of Ambient Listening during the visit with  Chris Cruz MD for chart documentation. 2/17/2025  17:57 EST

## 2025-04-01 ENCOUNTER — OFFICE VISIT (OUTPATIENT)
Dept: GASTROENTEROLOGY | Facility: CLINIC | Age: 60
End: 2025-04-01
Payer: COMMERCIAL

## 2025-04-01 VITALS
HEART RATE: 58 BPM | SYSTOLIC BLOOD PRESSURE: 116 MMHG | TEMPERATURE: 97.7 F | DIASTOLIC BLOOD PRESSURE: 82 MMHG | HEIGHT: 63 IN | BODY MASS INDEX: 28 KG/M2 | WEIGHT: 158 LBS | OXYGEN SATURATION: 97 %

## 2025-04-01 DIAGNOSIS — K56.1: ICD-10-CM

## 2025-04-01 DIAGNOSIS — N81.6 RECTOCELE: Primary | ICD-10-CM

## 2025-04-01 DIAGNOSIS — M62.89 PELVIC FLOOR DYSFUNCTION: ICD-10-CM

## 2025-04-01 DIAGNOSIS — R19.8 ALTERNATING CONSTIPATION AND DIARRHEA: ICD-10-CM

## 2025-04-01 PROCEDURE — 99213 OFFICE O/P EST LOW 20 MIN: CPT | Performed by: NURSE PRACTITIONER

## 2025-04-01 RX ORDER — FLUCONAZOLE 200 MG/1
TABLET ORAL
COMMUNITY
Start: 2025-03-11

## 2025-04-01 NOTE — PROGRESS NOTES
Chief Complaint   Patient presents with    GI Problem         History of Present Illness  Patient is a 59-year-old female who presents today for follow-up. She has a history of GERD, Barber's esophagus, bloating, alternating constipation diarrhea, and fecal incontinence.  Since last office visit she underwent hydrogen breath test which was negative for SIBO.  She had an anorectal manometry completed that showed decreased resting and squeeze pressures as well as attempted defecation feeling to relax with little change in pressure noted.  Recommendation was to consider pelvic floor therapy versus sacral nerve stimulation.  She also had a defecating proctogram that showed no significant posterior compartment pelvic floor descent, mild intrarectal intussusception, and a small anterior rectocele.    She started pelvic floor physical therapy and has also seen Dr. Cruz about these findings, surgery was not recommended at this time.    Patient presents today for follow-up.  Since last office visit she has been doing pelvic floor physical therapy, has 1 more session and will have completed therapy.  She has been following a Mediterranean diet and focusing on increasing dietary fiber.  She has also been using a probiotic experimenting with fiber supplements.  She takes magnesium citrate before bed.  Overall she feels the symptoms have improved significantly but she does still have to work to have regular bowel movements.  She has had no further issues with diarrhea.  She is generally having a daily bowel movement.    She recently returned from a [place] cruise, was able to eat normally while on her trip but has had some increase in constipation since she returned.     Result Review :       CR DEFECOGRAPHY (01/16/2025 10:31)    Anorectal Manometry (11/04/2024 14:23)    Breath Hydrogen Test (11/07/2024)    Office Visit with Saba Pineda APRN (10/22/2024)    US gallbladder (10/01/2024 07:57)    NM HIDA SCAN WITHOUT  "PHARMACOLOGICAL INTERVENTION (09/17/2024 12:34)    Tissue Pathology Exam (08/14/2024 12:20)    Colonoscopy (08/14/2024 11:50)    Upper GI Endoscopy (08/14/2024 11:51)    Vital Signs:   /82   Pulse 58   Temp 97.7 °F (36.5 °C)   Ht 160 cm (63\")   Wt 71.7 kg (158 lb)   SpO2 97%   BMI 27.99 kg/m²     Body mass index is 27.99 kg/m².     Physical Exam  Vitals reviewed.   Constitutional:       General: She is not in acute distress.     Appearance: She is well-developed.   HENT:      Head: Normocephalic and atraumatic.   Pulmonary:      Effort: Pulmonary effort is normal. No respiratory distress.   Abdominal:      General: Abdomen is flat. Bowel sounds are normal. There is no distension.      Palpations: Abdomen is soft.      Tenderness: There is no abdominal tenderness.   Skin:     General: Skin is dry.      Coloration: Skin is not pale.   Neurological:      Mental Status: She is alert and oriented to person, place, and time.   Psychiatric:         Thought Content: Thought content normal.           Assessment and Plan    Diagnoses and all orders for this visit:    1. Rectocele (Primary)    2. Intussusception of rectum    3. Pelvic floor dysfunction    4. Alternating constipation and diarrhea         Discussion  Patient presents today for follow-up after testing.  Reviewed test findings at today's office visit.  Recommended continued conservative management with recommendations from physical therapy and continue probiotics and fiber supplements.  Discussed if needed particularly around times of travel could consider adding in a daily stool softener.  As symptoms have improved we will continue current treatment and recommended follow-up in 1 year for reassessment.  She was instructed to call for any new or worsening symptoms.          Follow Up   Return in about 1 year (around 4/1/2026).    Patient Instructions      "

## 2025-07-23 ENCOUNTER — OFFICE VISIT (OUTPATIENT)
Dept: INTERNAL MEDICINE | Facility: CLINIC | Age: 60
End: 2025-07-23
Payer: COMMERCIAL

## 2025-07-23 ENCOUNTER — TELEPHONE (OUTPATIENT)
Dept: GASTROENTEROLOGY | Facility: CLINIC | Age: 60
End: 2025-07-23
Payer: COMMERCIAL

## 2025-07-23 VITALS
HEIGHT: 63 IN | DIASTOLIC BLOOD PRESSURE: 78 MMHG | TEMPERATURE: 97.7 F | BODY MASS INDEX: 28 KG/M2 | OXYGEN SATURATION: 98 % | SYSTOLIC BLOOD PRESSURE: 118 MMHG | HEART RATE: 76 BPM | WEIGHT: 158 LBS

## 2025-07-23 DIAGNOSIS — K52.9 GE (GASTROENTERITIS): Primary | ICD-10-CM

## 2025-07-23 DIAGNOSIS — R11.2 NAUSEA AND VOMITING, UNSPECIFIED VOMITING TYPE: ICD-10-CM

## 2025-07-23 PROCEDURE — 99213 OFFICE O/P EST LOW 20 MIN: CPT | Performed by: NURSE PRACTITIONER

## 2025-07-23 RX ORDER — ONDANSETRON 4 MG/1
4 TABLET, ORALLY DISINTEGRATING ORAL EVERY 8 HOURS PRN
Qty: 20 TABLET | Refills: 0 | Status: SHIPPED | OUTPATIENT
Start: 2025-07-23

## 2025-07-23 NOTE — TELEPHONE ENCOUNTER
7/23/25 8:50 AM Pt wanted to be seen today for diarrhea and abdominal pain, informed her we did not have an opening today, and she could go to the ER if needed. MT

## 2025-07-23 NOTE — TELEPHONE ENCOUNTER
Hub staff attempted to follow warm transfer process and was unsuccessful     Caller: Catie Wright    Relationship to patient: Self    Best call back number: [phone]    Patient is needing: PT IS REPORTING  SOFT STOOLS, VOMITING, DIARRHEA AND ABD PAIN. PT STATES SHE WENT OUT TO DINNER LAST NIGHT/BASED UPON HER MEAL SHE THINKS IT MAY BE FOOD POISONING, LACTOSE RELATED OR HER GALLBLADDER. PT IS REQUESTING AN URGENT APPT TODAY. PLEASE CONTACT PT TO ADVISE.

## 2025-07-23 NOTE — PROGRESS NOTES
"Chief Complaint  Abdominal Pain, Diarrhea, Vomiting, and Nausea (Since last night)    Subjective        Catie Wright presents to [facility]  History of Present Illness  She has a myriad of GI issues, including GERD, Barber's esophagus, bloating, mixed bowel pattern, rectocele. Follows GI. Last appointment with [facility], was 04/01/2025.     She went out to eat last night, ate French fries, mussels, and a piece of cheesecake around 7:30 PM at [facility]. Symptoms started around midnight last night, which included diarrhea and stomach cramping. Became very nauseated and vomited. She then had watery diarrhea. No headache, no fever. No BRBPR. Has not taken any medication for symptoms. Symptoms are slowly improving on their own. She is due to go out of town, flying, tomorrow and back on Sunday. Is due for a dental procedure and will take a prophylactic antibiotic, amoxicillin, on Monday.       Objective   Vital Signs:  /78   Pulse 76   Temp 97.7 °F (36.5 °C)   Ht 160 cm (63\")   Wt 71.7 kg (158 lb)   SpO2 98%   BMI 27.99 kg/m²   Estimated body mass index is 27.99 kg/m² as calculated from the following:    Height as of this encounter: 160 cm (63\").    Weight as of this encounter: 71.7 kg (158 lb).       BMI is >= 25 and <30. (Overweight) The following options were offered after discussion;: not discussed at today's office visit.       Physical Exam  Vitals and nursing note reviewed.   Constitutional:       General: She is not in acute distress.     Appearance: Normal appearance. She is not ill-appearing, toxic-appearing or diaphoretic.   Cardiovascular:      Rate and Rhythm: Normal rate and regular rhythm.      Heart sounds: Normal heart sounds.   Pulmonary:      Effort: Pulmonary effort is normal.      Breath sounds: Normal breath sounds.   Abdominal:      General: Bowel sounds are normal.      Palpations: Abdomen is soft.      Tenderness: There is abdominal tenderness " (mild with light palpation) in the epigastric area. There is no guarding.   Skin:     General: Skin is warm and dry.   Neurological:      General: No focal deficit present.      Mental Status: She is alert and oriented to person, place, and time. Mental status is at baseline.        Result Review :                   Assessment and Plan   Patient is a very pleasant 59-year-old female with personal history colon polyps, diverticulosis, history of rectocele, pelvic floor dysfunction, chondromalacia, psoriasis here with acute GI symptoms.         Diagnoses and all orders for this visit:    1. GE (gastroenteritis) (Primary)  Comments:  Suspect food poisoning. Stay hydrated. Drink sugar-free electrolyte solutions, such as Pedialyte. Follow up with [facility], or our office.    2. Nausea and vomiting, unspecified vomiting type  -     ondansetron ODT (ZOFRAN-ODT) 4 MG disintegrating tablet; Place 1 tablet on the tongue Every 8 (Eight) Hours As Needed for Nausea or Vomiting.  Dispense: 20 tablet; Refill: 0             Follow Up   Return for Annual physical.  Patient was given instructions and counseling regarding her condition or for health maintenance advice. Please see specific information pulled into the AVS if appropriate.

## 2025-08-06 ENCOUNTER — TELEPHONE (OUTPATIENT)
Dept: INTERNAL MEDICINE | Facility: CLINIC | Age: 60
End: 2025-08-06
Payer: COMMERCIAL

## 2025-08-06 DIAGNOSIS — R19.7 DIARRHEA, UNSPECIFIED TYPE: Primary | ICD-10-CM

## 2025-08-07 DIAGNOSIS — R19.7 DIARRHEA, UNSPECIFIED TYPE: ICD-10-CM

## 2025-08-08 ENCOUNTER — RESULTS FOLLOW-UP (OUTPATIENT)
Dept: INTERNAL MEDICINE | Facility: CLINIC | Age: 60
End: 2025-08-08
Payer: COMMERCIAL

## 2025-08-08 LAB
ALBUMIN SERPL-MCNC: 4.7 G/DL (ref 3.9–4.9)
ALP SERPL-CCNC: 68 IU/L (ref 44–121)
ALT SERPL-CCNC: 25 IU/L (ref 0–32)
AST SERPL-CCNC: 19 IU/L (ref 0–40)
BASOPHILS # BLD AUTO: 0 X10E3/UL (ref 0–0.2)
BASOPHILS NFR BLD AUTO: 0 %
BILIRUB SERPL-MCNC: 0.5 MG/DL (ref 0–1.2)
BUN SERPL-MCNC: 15 MG/DL (ref 8–27)
BUN/CREAT SERPL: 16 (ref 12–28)
CALCIUM SERPL-MCNC: 9.8 MG/DL (ref 8.7–10.3)
CHLORIDE SERPL-SCNC: 103 MMOL/L (ref 96–106)
CO2 SERPL-SCNC: 24 MMOL/L (ref 20–29)
CREAT SERPL-MCNC: 0.91 MG/DL (ref 0.57–1)
EGFRCR SERPLBLD CKD-EPI 2021: 72 ML/MIN/1.73
EOSINOPHIL # BLD AUTO: 0.2 X10E3/UL (ref 0–0.4)
EOSINOPHIL NFR BLD AUTO: 3 %
ERYTHROCYTE [DISTWIDTH] IN BLOOD BY AUTOMATED COUNT: 12.9 % (ref 11.7–15.4)
GLOBULIN SER CALC-MCNC: 2.3 G/DL (ref 1.5–4.5)
GLUCOSE SERPL-MCNC: 90 MG/DL (ref 70–99)
HCT VFR BLD AUTO: 44.2 % (ref 34–46.6)
HGB BLD-MCNC: 14.7 G/DL (ref 11.1–15.9)
IMM GRANULOCYTES # BLD AUTO: 0 X10E3/UL (ref 0–0.1)
IMM GRANULOCYTES NFR BLD AUTO: 0 %
LYMPHOCYTES # BLD AUTO: 1.7 X10E3/UL (ref 0.7–3.1)
LYMPHOCYTES NFR BLD AUTO: 30 %
MCH RBC QN AUTO: 30.8 PG (ref 26.6–33)
MCHC RBC AUTO-ENTMCNC: 33.3 G/DL (ref 31.5–35.7)
MCV RBC AUTO: 93 FL (ref 79–97)
MONOCYTES # BLD AUTO: 0.3 X10E3/UL (ref 0.1–0.9)
MONOCYTES NFR BLD AUTO: 5 %
NEUTROPHILS # BLD AUTO: 3.5 X10E3/UL (ref 1.4–7)
NEUTROPHILS NFR BLD AUTO: 62 %
PLATELET # BLD AUTO: 199 X10E3/UL (ref 150–450)
POTASSIUM SERPL-SCNC: 4.4 MMOL/L (ref 3.5–5.2)
PROT SERPL-MCNC: 7 G/DL (ref 6–8.5)
RBC # BLD AUTO: 4.77 X10E6/UL (ref 3.77–5.28)
SODIUM SERPL-SCNC: 141 MMOL/L (ref 134–144)
WBC # BLD AUTO: 5.8 X10E3/UL (ref 3.4–10.8)

## 2025-08-13 ENCOUNTER — OFFICE VISIT (OUTPATIENT)
Dept: GASTROENTEROLOGY | Facility: CLINIC | Age: 60
End: 2025-08-13
Payer: COMMERCIAL

## 2025-08-13 VITALS
SYSTOLIC BLOOD PRESSURE: 116 MMHG | HEART RATE: 64 BPM | TEMPERATURE: 96.5 F | BODY MASS INDEX: 27.04 KG/M2 | HEIGHT: 63 IN | DIASTOLIC BLOOD PRESSURE: 80 MMHG | OXYGEN SATURATION: 98 % | WEIGHT: 152.6 LBS

## 2025-08-13 DIAGNOSIS — R14.0 BLOATING: Primary | ICD-10-CM

## 2025-08-13 DIAGNOSIS — R19.7 DIARRHEA, UNSPECIFIED TYPE: ICD-10-CM

## 2025-08-13 PROCEDURE — 99214 OFFICE O/P EST MOD 30 MIN: CPT | Performed by: NURSE PRACTITIONER

## 2025-08-15 ENCOUNTER — OFFICE VISIT (OUTPATIENT)
Dept: INTERNAL MEDICINE | Facility: CLINIC | Age: 60
End: 2025-08-15
Payer: COMMERCIAL

## 2025-08-15 VITALS
TEMPERATURE: 98 F | BODY MASS INDEX: 26.75 KG/M2 | OXYGEN SATURATION: 99 % | HEIGHT: 63 IN | DIASTOLIC BLOOD PRESSURE: 74 MMHG | HEART RATE: 78 BPM | SYSTOLIC BLOOD PRESSURE: 118 MMHG | WEIGHT: 151 LBS

## 2025-08-15 DIAGNOSIS — Z23 NEED FOR VACCINATION: ICD-10-CM

## 2025-08-15 DIAGNOSIS — S80.812A ABRASION OF LEFT LOWER LEG, INITIAL ENCOUNTER: ICD-10-CM

## 2025-08-15 DIAGNOSIS — E55.9 VITAMIN D DEFICIENCY: ICD-10-CM

## 2025-08-15 DIAGNOSIS — Z00.00 ROUTINE PHYSICAL EXAMINATION: Primary | ICD-10-CM

## 2025-08-16 LAB
25(OH)D3+25(OH)D2 SERPL-MCNC: 36.2 NG/ML (ref 30–100)
CHOLEST SERPL-MCNC: 235 MG/DL (ref 100–199)
FT4I SERPL CALC-MCNC: 2.7 (ref 1.2–4.9)
HBA1C MFR BLD: 5.2 % (ref 4.8–5.6)
HDLC SERPL-MCNC: 46 MG/DL
LDLC SERPL CALC-MCNC: 159 MG/DL (ref 0–99)
T3RU NFR SERPL: 40 % (ref 24–39)
T4 SERPL-MCNC: 6.7 UG/DL (ref 4.5–12)
TRIGL SERPL-MCNC: 166 MG/DL (ref 0–149)
TSH SERPL DL<=0.005 MIU/L-ACNC: 0.64 UIU/ML (ref 0.45–4.5)
VLDLC SERPL CALC-MCNC: 30 MG/DL (ref 5–40)

## 2025-08-18 ENCOUNTER — TELEPHONE (OUTPATIENT)
Dept: INTERNAL MEDICINE | Facility: CLINIC | Age: 60
End: 2025-08-18
Payer: COMMERCIAL

## 2025-08-18 ENCOUNTER — RESULTS FOLLOW-UP (OUTPATIENT)
Dept: INTERNAL MEDICINE | Facility: CLINIC | Age: 60
End: 2025-08-18
Payer: COMMERCIAL

## 2025-08-18 DIAGNOSIS — R79.89 ABNORMAL THYROID BLOOD TEST: Primary | ICD-10-CM

## (undated) DEVICE — BLCK/BITE BLOX W/DENTL/RIM W/STRAP 54F

## (undated) DEVICE — MSK ENDO PORT O2 POM ELITE CURAPLEX A/

## (undated) DEVICE — Device

## (undated) DEVICE — FLEX ADVANTAGE 1500CC: Brand: FLEX ADVANTAGE

## (undated) DEVICE — GOWN PROC ENDOARMOR GI LVL3 HY/SHLD UNIV

## (undated) DEVICE — CANN O2 ETCO2 FITS ALL CONN CO2 SMPL A/ 7IN DISP LF

## (undated) DEVICE — KT ORCA ORCAPOD DISP STRL

## (undated) DEVICE — SINGLE-USE BIOPSY FORCEPS: Brand: RADIAL JAW 4

## (undated) DEVICE — VIAL FORMLN CAP 10PCT 20ML

## (undated) DEVICE — ADAPT CLN SCPE ENDO PORPOISE BX/50 DISP